# Patient Record
Sex: MALE | Race: WHITE | Employment: UNEMPLOYED | URBAN - METROPOLITAN AREA
[De-identification: names, ages, dates, MRNs, and addresses within clinical notes are randomized per-mention and may not be internally consistent; named-entity substitution may affect disease eponyms.]

---

## 2017-11-08 ENCOUNTER — HOSPITAL ENCOUNTER (EMERGENCY)
Age: 32
Discharge: HOME | End: 2017-11-08
Attending: EMERGENCY MEDICINE
Payer: COMMERCIAL

## 2017-11-08 VITALS
SYSTOLIC BLOOD PRESSURE: 135 MMHG | TEMPERATURE: 98.5 F | DIASTOLIC BLOOD PRESSURE: 92 MMHG | RESPIRATION RATE: 16 BRPM | HEART RATE: 74 BPM | OXYGEN SATURATION: 97 % | WEIGHT: 130 LBS | HEIGHT: 65 IN | BODY MASS INDEX: 21.66 KG/M2

## 2017-11-08 DIAGNOSIS — M54.50 CHRONIC BILATERAL LOW BACK PAIN WITHOUT SCIATICA: Primary | ICD-10-CM

## 2017-11-08 DIAGNOSIS — G89.29 CHRONIC BILATERAL LOW BACK PAIN WITHOUT SCIATICA: Primary | ICD-10-CM

## 2017-11-08 PROCEDURE — 99283 EMERGENCY DEPT VISIT LOW MDM: CPT

## 2017-11-08 PROCEDURE — 74011250637 HC RX REV CODE- 250/637: Performed by: PHYSICIAN ASSISTANT

## 2017-11-08 RX ORDER — IBUPROFEN 800 MG/1
800 TABLET ORAL
Qty: 20 TAB | Refills: 0 | Status: SHIPPED | OUTPATIENT
Start: 2017-11-08 | End: 2017-11-15

## 2017-11-08 RX ORDER — ACETAMINOPHEN 325 MG/1
650 TABLET ORAL
Qty: 20 TAB | Refills: 0 | Status: SHIPPED | OUTPATIENT
Start: 2017-11-08 | End: 2018-06-03

## 2017-11-08 RX ORDER — HYDROCODONE BITARTRATE AND ACETAMINOPHEN 5; 325 MG/1; MG/1
1 TABLET ORAL
Status: COMPLETED | OUTPATIENT
Start: 2017-11-08 | End: 2017-11-08

## 2017-11-08 RX ADMIN — HYDROCODONE BITARTRATE AND ACETAMINOPHEN 1 TABLET: 5; 325 TABLET ORAL at 10:19

## 2017-11-08 NOTE — DISCHARGE INSTRUCTIONS
Learning About How to Have a Healthy Back  What causes back pain? Back pain is often caused by overuse, strain, or injury. For example, people often hurt their backs playing sports or working in the yard, being jolted in a car accident, or lifting something too heavy. Aging plays a part too. Your bones and muscles tend to lose strength as you age, which makes injury more likely. The spongy discs between the bones of the spine (vertebrae) may suffer from wear and tear and no longer provide enough cushion between the bones. A disc that bulges or breaks open (herniated disc) can press on nerves, causing back pain. In some people, back pain is the result of arthritis, broken vertebrae caused by bone loss (osteoporosis), illness, or a spine problem. Although most people have back pain at one time or another, there are steps you can take to make it less likely. How can you have a healthy back? Reduce stress on your back through good posture  Slumping or slouching alone may not cause low back pain. But after the back has been strained or injured, bad posture can make pain worse. · Sleep in a position that maintains your back's normal curves and on a mattress that feels comfortable. Sleep on your side with a pillow between your knees, or sleep on your back with a pillow under your knees. These positions can reduce strain on your back. · Stand and sit up straight. \"Good posture\" generally means your ears, shoulders, and hips are in a straight line. · If you must stand for a long time, put one foot on a stool, ledge, or box. Switch feet every now and then. · Sit in a chair that is low enough to let you place both feet flat on the floor with both knees nearly level with your hips. If your chair or desk is too high, use a footrest to raise your knees. Place a small pillow, a rolled-up towel, or a lumbar roll in the curve of your back if you need extra support.   · Try a kneeling chair, which helps tilt your hips forward. This takes pressure off your lower back. · Try sitting on an exercise ball. It can rock from side to side, which helps keep your back loose. · When driving, keep your knees nearly level with your hips. Sit straight, and drive with both hands on the steering wheel. Your arms should be in a slightly bent position. Reduce stress on your back through careful lifting  · Squat down, bending at the hips and knees only. If you need to, put one knee to the floor and extend your other knee in front of you, bent at a right angle (half kneeling). · Press your chest straight forward. This helps keep your upper back straight while keeping a slight arch in your low back. · Hold the load as close to your body as possible, at the level of your belly button (navel). · Use your feet to change direction, taking small steps. · Lead with your hips as you change direction. Keep your shoulders in line with your hips as you move. · Set down your load carefully, squatting with your knees and hips only. Exercise and stretch your back  · Do some exercise on most days of the week, if your doctor says it is okay. You can walk, run, swim, or cycle. · Stretch your back muscles. Here are a few exercises to try:  Wale Grist on your back, and gently pull one bent knee to your chest. Put that foot back on the floor, and then pull the other knee to your chest.  ¨ Do pelvic tilts. Lie on your back with your knees bent. Tighten your stomach muscles. Pull your belly button (navel) in and up toward your ribs. You should feel like your back is pressing to the floor and your hips and pelvis are slightly lifting off the floor. Hold for 6 seconds while breathing smoothly. ¨ Sit with your back flat against a wall. · Keep your core muscles strong. The muscles of your back, belly (abdomen), and buttocks support your spine. ¨ Pull in your belly and imagine pulling your navel toward your spine. Hold this for 6 seconds, then relax.  Remember to keep breathing normally as you tense your muscles. ¨ Do curl-ups. Always do them with your knees bent. Keep your low back on the floor, and curl your shoulders toward your knees using a smooth, slow motion. Keep your arms folded across your chest. If this bothers your neck, try putting your hands behind your neck (not your head), with your elbows spread apart. ¨ Lie on your back with your knees bent and your feet flat on the floor. Tighten your belly muscles, and then push with your feet and raise your buttocks up a few inches. Hold this position 6 seconds as you continue to breathe normally, then lower yourself slowly to the floor. Repeat 8 to 12 times. ¨ If you like group exercise, try Pilates or yoga. These classes have poses that strengthen the core muscles. Lead a healthy lifestyle  · Stay at a healthy weight to avoid strain on your back. · Do not smoke. Smoking increases the risk of osteoporosis, which weakens the spine. If you need help quitting, talk to your doctor about stop-smoking programs and medicines. These can increase your chances of quitting for good. Where can you learn more? Go to http://garry-kyree.info/. Enter L315 in the search box to learn more about \"Learning About How to Have a Healthy Back. \"  Current as of: March 21, 2017  Content Version: 11.4  © 8642-9166 [employer]. Care instructions adapted under license by [employer] (which disclaims liability or warranty for this information). If you have questions about a medical condition or this instruction, always ask your healthcare professional. [place] [address] any warranty or liability for your use of this information.

## 2017-11-08 NOTE — ED TRIAGE NOTES
C/o lower back pain x 4-5 days, LLE is slightly shorter than the other from previous injury so gait is uneven, denies specific injury/trauma, pt is also [facility]

## 2017-11-08 NOTE — ED PROVIDER NOTES
Patient is a 27 y.o. male presenting with back pain. The history is provided by the patient. The history is limited by the condition of the patient (Pt hearing impaired. ). Back Pain    This is a recurrent (Acute on chronic low back pain (hx of trauma w/ Lt leg shortening years ago). Pain onset today. NKI/trauma. Increased outdoor work at home.) problem. The current episode started 6 to 12 hours ago. The problem has not changed since onset. The problem occurs constantly. Patient reports not work related injury. The pain is associated with no known injury. The pain is present in the lower back. The quality of the pain is described as aching. The pain does not radiate. The pain is at a severity of 10/10. The pain is moderate. The symptoms are aggravated by bending, twisting and certain positions. The pain is the same all the time. Pertinent negatives include no chest pain, no fever, no numbness, no weight loss, no headaches, no abdominal pain, no abdominal swelling, no bowel incontinence, no perianal numbness, no bladder incontinence, no dysuria, no pelvic pain, no leg pain, no paresthesias, no paresis, no tingling and no weakness. He has tried nothing (Hx of narcotic rx for pain. no medicine today.) for the symptoms. No past medical history on file. Past Surgical History:   Procedure Laterality Date    HX ORTHOPAEDIC      left leg         No family history on file. Social History     Social History    Marital status: SINGLE     Spouse name: N/A    Number of children: N/A    Years of education: N/A     Occupational History    Not on file.      Social History Main Topics    Smoking status: Current Every Day Smoker    Smokeless tobacco: Not on file    Alcohol use Yes    Drug use: Not on file    Sexual activity: Not on file     Other Topics Concern    Not on file     Social History Narrative    No narrative on file         ALLERGIES: Sulfa (sulfonamide antibiotics)    Review of Systems Constitutional: Negative for activity change, chills, fatigue, fever and weight loss. HENT: Negative. Eyes: Negative. Respiratory: Negative. Negative for cough and shortness of breath. Cardiovascular: Negative for chest pain, palpitations and leg swelling. Gastrointestinal: Negative for abdominal pain, bowel incontinence, diarrhea, nausea and vomiting. Genitourinary: Negative for bladder incontinence, dysuria and pelvic pain. Musculoskeletal: Positive for back pain and gait problem (chronic). Negative for arthralgias, joint swelling, myalgias, neck pain and neck stiffness. Skin: Negative. Negative for rash. Neurological: Negative for tingling, weakness, numbness, headaches and paresthesias. Psychiatric/Behavioral: Negative. Vitals:    11/08/17 0956   BP: (!) 135/92   Pulse: 74   Resp: 16   Temp: 98.5 °F (36.9 °C)   SpO2: 97%   Weight: 59 kg (130 lb)   Height: 5' 5\" (1.651 m)            Physical Exam   Constitutional: He is oriented to person, place, and time. He appears well-developed and well-nourished. No distress. HENT:   Head: Normocephalic and atraumatic. Right Ear: External ear normal.   Left Ear: External ear normal.   Nose: Nose normal.   Eyes: Conjunctivae and EOM are normal. Pupils are equal, round, and reactive to light. Neck: Normal range of motion. Pulmonary/Chest: Effort normal. No accessory muscle usage. No respiratory distress. Abdominal: There is no tenderness. Musculoskeletal: Normal range of motion. Cervical back: Normal.        Thoracic back: Normal.        Lumbar back: He exhibits pain. He exhibits normal range of motion, no tenderness, no bony tenderness, no swelling, no edema, no deformity, no laceration, no spasm and normal pulse. No paraspinal TTP. No midline spinal TTP. FROM. SLR (-) while seated. Left leg shortened. Neurological: He is alert and oriented to person, place, and time. Skin: Skin is warm, dry and intact.  No bruising, no ecchymosis and no rash noted. He is not diaphoretic. No erythema. No pallor. Psychiatric: He has a normal mood and affect. His speech is normal and behavior is normal. Judgment and thought content normal.   Nursing note and vitals reviewed. MDM  Number of Diagnoses or Management Options  Chronic bilateral low back pain without sciatica:   Diagnosis management comments: DDx: lumbago, acute on chronic back pain, sciatica, herniated disc, strain, sprain, fx unlikely (nki), abscess, neoplasm unlikely    LABORATORY TESTS:  No results found for this or any previous visit (from the past 12 hour(s)). IMAGING RESULTS:  No orders to display    MEDICATIONS GIVEN:  Medications  HYDROcodone-acetaminophen (NORCO) 5-325 mg per tablet 1 Tab (not administered)    IMPRESSION:  Chronic bilateral low back pain without sciatica  (primary encounter diagnosis)    PLAN:  1. Current Discharge Medication List    START taking these medications    ibuprofen (MOTRIN) 800 mg tablet  Take 1 Tab by mouth every six (6) hours as needed for Pain for up to 7 days. Qty: 20 Tab Refills: 0    acetaminophen (TYLENOL) 325 mg tablet  Take 2 Tabs by mouth every four (4) hours as needed for Pain. Qty: 20 Tab Refills: 0      CONTINUE these medications which have NOT CHANGED    oxyCODONE IR (ROXICODONE) 5 mg immediate release tablet  Take 1 Tab by mouth every four (4) hours as needed for Pain. Max Daily Amount: 30 mg.  Qty: 12 Tab Refills: 0        2.  Follow-up Information     Follow up With Details Comments [address] Schedule an appointment as soon as   possible for a visit in 1 week As needed, If symptoms worsen Emiliano Marquez [address]  [place] [address] [facility] Orthopedic Surgery Schedule an appointment as soon as possible for a   visit in 1 week As needed, If symptoms worsen [address] [address] [phone]      Return to ED if worse                  Amount and/or Complexity of Data Reviewed  Tests in the medicine section of CPT®: ordered and reviewed    Patient Progress  Patient progress: stable    ED Course       Procedures    10:15 AM  Pt refused muscle relaxer and NSAIDs. Discussed with patient risks and benefits of xray for new back pain. Explained new guidelines to treat with trial of medications if there are no red flag signs or symptoms. Follow up with ortho and/or PCP if symptoms continue and/or worsen. Reviewed red flag symptoms (including saddle numbness, tingling, weakness, hematuria, chest pain, abdominal pain, n/v, fever) and to return to ED in the case of any. 10:16 AM  I have discussed with patient their diagnosis, treatment, and follow up plan. The patient agrees to follow up as outlined in discharge paperwork and also to return to the ED with any worsening.  Claudia Espinal PA-C

## 2017-11-08 NOTE — ED NOTES
Pt given printed discharge instructions and 2 script(s). Pt verbalized understanding of instructions and script(s). Pt verbalized importance of following up with ortho. Pt alert and oriented, in no acute distress, ambulatory with self. Patient isn't happy about scripts for Tylenol and Motrin, pt states, \"they don't work for me. \"    Pt admitted to me that he drinks a beer every morning to stop his hands from shaking. I agree with providers choice not to prescribe opiates. I strongly urged patient to follow up with specialist for evaluation.

## 2017-11-08 NOTE — ED NOTES
Emergency Department Nursing Plan of Care       The Nursing Plan of Care is developed from the Nursing assessment and Emergency Department Attending provider initial evaluation. The plan of care may be reviewed in the ED Provider note.     The Plan of Care was developed with the following considerations:   Patient / Family readiness to learn indicated by:verbalized understanding  Persons(s) to be included in education: patient  Barriers to Learning/Limitations:hearing, pt reads lips, pt understood he needs to see specialist for his back    [address], RN    11/8/2017   10:25 AM

## 2018-06-03 ENCOUNTER — HOSPITAL ENCOUNTER (INPATIENT)
Age: 33
LOS: 3 days | Discharge: HOME HEALTH | DRG: 494 | End: 2018-06-06
Attending: EMERGENCY MEDICINE | Admitting: ORTHOPAEDIC SURGERY
Payer: MEDICARE

## 2018-06-03 ENCOUNTER — APPOINTMENT (OUTPATIENT)
Dept: GENERAL RADIOLOGY | Age: 33
DRG: 494 | End: 2018-06-03
Attending: PHYSICIAN ASSISTANT
Payer: MEDICARE

## 2018-06-03 DIAGNOSIS — S82.251A CLOSED DISPLACED COMMINUTED FRACTURE OF SHAFT OF RIGHT TIBIA, INITIAL ENCOUNTER: Primary | ICD-10-CM

## 2018-06-03 PROBLEM — S82.409A TIBIA/FIBULA FRACTURE, SHAFT: Status: ACTIVE | Noted: 2018-06-03

## 2018-06-03 PROBLEM — S82.209A TIBIA/FIBULA FRACTURE, SHAFT: Status: ACTIVE | Noted: 2018-06-03

## 2018-06-03 LAB
ABO + RH BLD: NORMAL
ALBUMIN SERPL-MCNC: 4.2 G/DL (ref 3.5–5)
ALBUMIN/GLOB SERPL: 1 {RATIO} (ref 1.1–2.2)
ALP SERPL-CCNC: 59 U/L (ref 45–117)
ALT SERPL-CCNC: 131 U/L (ref 12–78)
ANION GAP SERPL CALC-SCNC: 6 MMOL/L (ref 5–15)
APPEARANCE UR: ABNORMAL
AST SERPL-CCNC: 232 U/L (ref 15–37)
BACTERIA URNS QL MICRO: NEGATIVE /HPF
BASOPHILS # BLD: 0.1 K/UL (ref 0–0.1)
BASOPHILS NFR BLD: 1 % (ref 0–1)
BILIRUB SERPL-MCNC: 0.3 MG/DL (ref 0.2–1)
BILIRUB UR QL CFM: NEGATIVE
BLOOD GROUP ANTIBODIES SERPL: NORMAL
BUN SERPL-MCNC: 8 MG/DL (ref 6–20)
BUN/CREAT SERPL: 9 (ref 12–20)
CALCIUM SERPL-MCNC: 8.6 MG/DL (ref 8.5–10.1)
CHLORIDE SERPL-SCNC: 101 MMOL/L (ref 97–108)
CO2 SERPL-SCNC: 29 MMOL/L (ref 21–32)
COLOR UR: ABNORMAL
CREAT SERPL-MCNC: 0.9 MG/DL (ref 0.7–1.3)
DIFFERENTIAL METHOD BLD: ABNORMAL
EOSINOPHIL # BLD: 0 K/UL (ref 0–0.4)
EOSINOPHIL NFR BLD: 1 % (ref 0–7)
EPITH CASTS URNS QL MICRO: ABNORMAL /LPF
ERYTHROCYTE [DISTWIDTH] IN BLOOD BY AUTOMATED COUNT: 13.5 % (ref 11.5–14.5)
GLOBULIN SER CALC-MCNC: 4.1 G/DL (ref 2–4)
GLUCOSE SERPL-MCNC: 84 MG/DL (ref 65–100)
GLUCOSE UR STRIP.AUTO-MCNC: NEGATIVE MG/DL
HCT VFR BLD AUTO: 44.9 % (ref 36.6–50.3)
HGB BLD-MCNC: 15.7 G/DL (ref 12.1–17)
HGB UR QL STRIP: ABNORMAL
HYALINE CASTS URNS QL MICRO: ABNORMAL /LPF (ref 0–5)
IMM GRANULOCYTES # BLD: 0 K/UL (ref 0–0.04)
IMM GRANULOCYTES NFR BLD AUTO: 1 % (ref 0–0.5)
INR PPP: 0.9 (ref 0.9–1.1)
KETONES UR QL STRIP.AUTO: 15 MG/DL
LEUKOCYTE ESTERASE UR QL STRIP.AUTO: NEGATIVE
LYMPHOCYTES # BLD: 1.2 K/UL (ref 0.8–3.5)
LYMPHOCYTES NFR BLD: 16 % (ref 12–49)
MCH RBC QN AUTO: 34.1 PG (ref 26–34)
MCHC RBC AUTO-ENTMCNC: 35 G/DL (ref 30–36.5)
MCV RBC AUTO: 97.6 FL (ref 80–99)
MONOCYTES # BLD: 0.8 K/UL (ref 0–1)
MONOCYTES NFR BLD: 10 % (ref 5–13)
NEUTS SEG # BLD: 5.3 K/UL (ref 1.8–8)
NEUTS SEG NFR BLD: 72 % (ref 32–75)
NITRITE UR QL STRIP.AUTO: NEGATIVE
NRBC # BLD: 0 K/UL (ref 0–0.01)
NRBC BLD-RTO: 0 PER 100 WBC
PH UR STRIP: 5 [PH] (ref 5–8)
PLATELET # BLD AUTO: 160 K/UL (ref 150–400)
PMV BLD AUTO: 9.5 FL (ref 8.9–12.9)
POTASSIUM SERPL-SCNC: 4.2 MMOL/L (ref 3.5–5.1)
PROT SERPL-MCNC: 8.3 G/DL (ref 6.4–8.2)
PROT UR STRIP-MCNC: 30 MG/DL
PROTHROMBIN TIME: 9.2 SEC (ref 9–11.1)
RBC # BLD AUTO: 4.6 M/UL (ref 4.1–5.7)
RBC #/AREA URNS HPF: ABNORMAL /HPF (ref 0–5)
SODIUM SERPL-SCNC: 136 MMOL/L (ref 136–145)
SP GR UR REFRACTOMETRY: 1.03 (ref 1–1.03)
SPECIMEN EXP DATE BLD: NORMAL
UA: UC IF INDICATED,UAUC: ABNORMAL
UROBILINOGEN UR QL STRIP.AUTO: 1 EU/DL (ref 0.2–1)
WBC # BLD AUTO: 7.4 K/UL (ref 4.1–11.1)
WBC URNS QL MICRO: ABNORMAL /HPF (ref 0–4)

## 2018-06-03 PROCEDURE — 81001 URINALYSIS AUTO W/SCOPE: CPT | Performed by: PHYSICIAN ASSISTANT

## 2018-06-03 PROCEDURE — 94761 N-INVAS EAR/PLS OXIMETRY MLT: CPT

## 2018-06-03 PROCEDURE — 73630 X-RAY EXAM OF FOOT: CPT

## 2018-06-03 PROCEDURE — 71045 X-RAY EXAM CHEST 1 VIEW: CPT

## 2018-06-03 PROCEDURE — 75810000053 HC SPLINT APPLICATION

## 2018-06-03 PROCEDURE — 74011250637 HC RX REV CODE- 250/637: Performed by: PHYSICIAN ASSISTANT

## 2018-06-03 PROCEDURE — 74011250636 HC RX REV CODE- 250/636: Performed by: PHYSICIAN ASSISTANT

## 2018-06-03 PROCEDURE — 36415 COLL VENOUS BLD VENIPUNCTURE: CPT | Performed by: EMERGENCY MEDICINE

## 2018-06-03 PROCEDURE — 99285 EMERGENCY DEPT VISIT HI MDM: CPT

## 2018-06-03 PROCEDURE — 73590 X-RAY EXAM OF LOWER LEG: CPT

## 2018-06-03 PROCEDURE — 86900 BLOOD TYPING SEROLOGIC ABO: CPT | Performed by: PHYSICIAN ASSISTANT

## 2018-06-03 PROCEDURE — 85610 PROTHROMBIN TIME: CPT | Performed by: PHYSICIAN ASSISTANT

## 2018-06-03 PROCEDURE — 80053 COMPREHEN METABOLIC PANEL: CPT | Performed by: EMERGENCY MEDICINE

## 2018-06-03 PROCEDURE — 65270000029 HC RM PRIVATE

## 2018-06-03 PROCEDURE — 73610 X-RAY EXAM OF ANKLE: CPT

## 2018-06-03 PROCEDURE — 93005 ELECTROCARDIOGRAM TRACING: CPT

## 2018-06-03 PROCEDURE — 85025 COMPLETE CBC W/AUTO DIFF WBC: CPT | Performed by: EMERGENCY MEDICINE

## 2018-06-03 RX ORDER — AMOXICILLIN 250 MG
2 CAPSULE ORAL 2 TIMES DAILY
Status: DISCONTINUED | OUTPATIENT
Start: 2018-06-03 | End: 2018-06-04

## 2018-06-03 RX ORDER — SODIUM CHLORIDE 9 MG/ML
75 INJECTION, SOLUTION INTRAVENOUS CONTINUOUS
Status: DISCONTINUED | OUTPATIENT
Start: 2018-06-03 | End: 2018-06-06 | Stop reason: HOSPADM

## 2018-06-03 RX ORDER — OXYCODONE HYDROCHLORIDE 5 MG/1
10 TABLET ORAL
Status: DISCONTINUED | OUTPATIENT
Start: 2018-06-03 | End: 2018-06-04

## 2018-06-03 RX ORDER — CEFAZOLIN SODIUM/WATER 2 G/20 ML
2 SYRINGE (ML) INTRAVENOUS
Status: COMPLETED | OUTPATIENT
Start: 2018-06-04 | End: 2018-06-04

## 2018-06-03 RX ORDER — HYDROCODONE BITARTRATE AND ACETAMINOPHEN 5; 325 MG/1; MG/1
1 TABLET ORAL
Status: COMPLETED | OUTPATIENT
Start: 2018-06-03 | End: 2018-06-03

## 2018-06-03 RX ORDER — ONDANSETRON 2 MG/ML
4 INJECTION INTRAMUSCULAR; INTRAVENOUS
Status: DISCONTINUED | OUTPATIENT
Start: 2018-06-03 | End: 2018-06-06 | Stop reason: HOSPADM

## 2018-06-03 RX ORDER — MORPHINE SULFATE 10 MG/ML
6 INJECTION, SOLUTION INTRAMUSCULAR; INTRAVENOUS
Status: COMPLETED | OUTPATIENT
Start: 2018-06-03 | End: 2018-06-03

## 2018-06-03 RX ORDER — CEFAZOLIN SODIUM/WATER 2 G/20 ML
2 SYRINGE (ML) INTRAVENOUS EVERY 8 HOURS
Status: DISCONTINUED | OUTPATIENT
Start: 2018-06-03 | End: 2018-06-03 | Stop reason: SDUPTHER

## 2018-06-03 RX ORDER — SODIUM CHLORIDE 0.9 % (FLUSH) 0.9 %
5-10 SYRINGE (ML) INJECTION AS NEEDED
Status: DISCONTINUED | OUTPATIENT
Start: 2018-06-03 | End: 2018-06-06

## 2018-06-03 RX ORDER — SODIUM CHLORIDE 0.9 % (FLUSH) 0.9 %
5-10 SYRINGE (ML) INJECTION EVERY 8 HOURS
Status: DISCONTINUED | OUTPATIENT
Start: 2018-06-03 | End: 2018-06-06

## 2018-06-03 RX ORDER — OXYCODONE HYDROCHLORIDE 5 MG/1
5 TABLET ORAL
Status: DISCONTINUED | OUTPATIENT
Start: 2018-06-03 | End: 2018-06-04

## 2018-06-03 RX ORDER — NALOXONE HYDROCHLORIDE 0.4 MG/ML
0.4 INJECTION, SOLUTION INTRAMUSCULAR; INTRAVENOUS; SUBCUTANEOUS AS NEEDED
Status: DISCONTINUED | OUTPATIENT
Start: 2018-06-03 | End: 2018-06-06

## 2018-06-03 RX ORDER — ACETAMINOPHEN 325 MG/1
650 TABLET ORAL EVERY 6 HOURS
Status: DISCONTINUED | OUTPATIENT
Start: 2018-06-03 | End: 2018-06-04

## 2018-06-03 RX ADMIN — MORPHINE SULFATE 6 MG: 10 INJECTION INTRAVENOUS at 11:40

## 2018-06-03 RX ADMIN — SODIUM CHLORIDE 75 ML/HR: 900 INJECTION, SOLUTION INTRAVENOUS at 14:33

## 2018-06-03 RX ADMIN — OXYCODONE HYDROCHLORIDE 10 MG: 5 TABLET ORAL at 21:17

## 2018-06-03 RX ADMIN — OXYCODONE HYDROCHLORIDE 5 MG: 5 TABLET ORAL at 17:04

## 2018-06-03 RX ADMIN — Medication 10 ML: at 14:34

## 2018-06-03 RX ADMIN — SODIUM CHLORIDE 75 ML/HR: 900 INJECTION, SOLUTION INTRAVENOUS at 17:08

## 2018-06-03 RX ADMIN — OXYCODONE HYDROCHLORIDE 5 MG: 5 TABLET ORAL at 17:06

## 2018-06-03 RX ADMIN — ACETAMINOPHEN 650 MG: 325 TABLET ORAL at 17:02

## 2018-06-03 RX ADMIN — Medication 5 ML: at 21:17

## 2018-06-03 RX ADMIN — HYDROCODONE BITARTRATE AND ACETAMINOPHEN 1 TABLET: 5; 325 TABLET ORAL at 08:59

## 2018-06-03 RX ADMIN — SODIUM CHLORIDE 75 ML/HR: 900 INJECTION, SOLUTION INTRAVENOUS at 21:18

## 2018-06-03 NOTE — ED NOTES
Long posterior cast placed by PA and ED tech. Good cap refill, toes cool to touch (unchanged from prior assessment)  Pt tolerated well.

## 2018-06-03 NOTE — ED NOTES
Pt's friend, Gilford Fetter, back to bedside. Gilford Fetter confirms pt's SO is also deaf, reports her speech is difficult to understand. States she is home with 2 children and will not be coming in. He attempted to replace the hearing aid batteries but was unable to find replacements today. States he will be back tomorrow. Pt is able to text but unable to hear on the phone.

## 2018-06-03 NOTE — ED NOTES
Transported to room 26 via stretcher from [facility]. Right lower leg elevated on several blankets for comfort and ice pack applied. +pedal pulse, brick cap refill. Pain reported 10/10. CODE fX called.

## 2018-06-03 NOTE — ROUTINE PROCESS
Bedside and Verbal shift change report given to [address] (oncoming nurse) by Nicolasa Tam RN (offgoing nurse). Report included the following information SBAR, Kardex, Intake/Output, MAR and Recent Results.

## 2018-06-03 NOTE — ED NOTES
Case discussed with ED PA and Ortho PA; will plan for admission with surgery tomorrow; will place in long cast and medicate for pain.

## 2018-06-03 NOTE — ROUTINE PROCESS
TRANSFER - OUT REPORT:    Verbal report given to [facility] [facility] RN(name) on Noemi Hargrove  being transferred to [phone] (unit) for routine progression of care       Report consisted of patients Situation, Background, Assessment and   Recommendations(SBAR). Information from the following report(s) ED Summary and MAR was reviewed with the receiving nurse. Lines:   Peripheral IV 06/03/18 Left Antecubital (Active)   Site Assessment Clean, dry, & intact 6/3/2018  9:31 AM   Phlebitis Assessment 0 6/3/2018  9:31 AM   Infiltration Assessment 0 6/3/2018  9:31 AM   Dressing Status Clean, dry, & intact 6/3/2018  9:31 AM   Dressing Type Transparent 6/3/2018  9:31 AM   Hub Color/Line Status Flushed 6/3/2018  9:31 AM   Action Taken Blood drawn 6/3/2018  9:31 AM        Opportunity for questions and clarification was provided. Patient transported with:   Tech or transport team to transfer. Will do EKG prior to transport.
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
## 2018-06-03 NOTE — IP AVS SNAPSHOT
[address] [facility] 
[phone] Patient: Marcia Niño MRN: [id number] :1985 About your hospitalization You were admitted on:  Amber 3, 2018 You last received care in the:  \[facility]\"" 3 ORTHOPEDICS You were discharged on:  2018 Why you were hospitalized Your primary diagnosis was:  Not on File Your diagnoses also included:  Tibia/Fibula Fracture, Shaft Follow-up Information Follow up With Details Comments Contact Info AT HOME CARE  This is your home health provider  [address] [facility] [address] [phone] [facility]  This is your provider for rolling [address] [address] 
[phone] None   None (395) Patient stated that they have no PCP Unique Stockton MD In 1 month  [address] [facility] 
[phone] Discharge Orders None A check abe indicates which time of day the medication should be taken. My Medications START taking these medications Instructions Each Dose to Equal  
 Morning Noon Evening Bedtime  
 aspirin 81 mg chewable tablet Your last dose was: Your next dose is: Take 1 Tab by mouth two (2) times a day. 81 mg  
    
   
   
   
  
 famotidine 20 mg tablet Commonly known as:  PEPCID Your last dose was: Your next dose is: Take 1 Tab by mouth two (2) times a day. 20 mg  
    
   
   
   
  
 naloxone 2 mg/actuation Spry Commonly known as:  [employer] Your last dose was: Your next dose is:    
   
   
 Use 1 spray intranasally into 1 nostril. Use a new Narcan nasal spray for subsequent doses and administer into alternating nostrils. May repeat every 2 to 3 minutes as needed. Indications: OPIATE-INDUCED RESPIRATORY DEPRESSION, Opioid Toxicity oxyCODONE IR 5 mg immediate release tablet Commonly known as:  Major Eddie Your last dose was: Your next dose is: Take 1-2 Tabs by mouth every four (4) hours as needed. Max Daily Amount: 60 mg.  
 5-10 mg  
    
   
   
   
  
 senna-docusate 8.6-50 mg per tablet Commonly known as:  Rizwana Roller Your last dose was: Your next dose is: Take 1 Tab by mouth two (2) times a day. 1 Tab CONTINUE taking these medications Instructions Each Dose to Equal  
 Morning Noon Evening Bedtime BC HEADACHE POWDER PO Your last dose was: Your next dose is: Take  by mouth. Where to Get Your Medications Information on where to get these meds will be given to you by the nurse or doctor. ! Ask your nurse or doctor about these medications  
  aspirin 81 mg chewable tablet  
 famotidine 20 mg tablet  
 naloxone 2 mg/actuation Spry  
 oxyCODONE IR 5 mg immediate release tablet  
 senna-docusate 8.6-50 mg per tablet Opioid Education Prescription Opioids: What You Need to Know: 
 
Prescription opioids can be used to help relieve moderate-to-severe pain and are often prescribed following a surgery or injury, or for certain health conditions. These medications can be an important part of treatment but also come with serious risks. Opioids are strong pain medicines. Examples include hydrocodone, oxycodone, fentanyl, and morphine. Heroin is an example of an illegal opioid. It is important to work with your health care provider to make sure you are getting the safest, most effective care. WHAT ARE THE RISKS AND SIDE EFFECTS OF OPIOID USE? Prescription opioids carry serious risks of addiction and overdose, especially with prolonged use. An opioid overdose, often marked by slow breathing, can cause sudden death.   The use of prescription opioids can have a number of side effects as well, even when taken as directed. · Tolerance-meaning you might need to take more of a medication for the same pain relief · Physical dependence-meaning you have symptoms of withdrawal when the medication is stopped. Withdrawal symptoms can include nausea, sweating, chills, diarrhea, stomach cramps, and muscle aches. Withdrawal can last up to several weeks, depending on which drug you took and how long you took it. · Increased sensitivity to pain · Constipation · Nausea, vomiting, and dry mouth · Sleepiness and dizziness · Confusion · Depression · Low levels of testosterone that can result in lower sex drive, energy, and strength · Itching and sweating RISKS ARE GREATER WITH:      
· History of drug misuse, substance use disorder, or overdose · Mental health conditions (such as depression or anxiety) · Sleep apnea · Older age (72 years or older) · Pregnancy Avoid alcohol while taking prescription opioids. Also, unless specifically advised by your health care provider, medications to avoid include: · Benzodiazepines (such as Xanax or Valium) · Muscle relaxants (such as Soma or Flexeril) · Hypnotics (such as Ambien or Lunesta) · Other prescription opioids KNOW YOUR OPTIONS Talk to your health care provider about ways to manage your pain that don't involve prescription opioids. Some of these options may actually work better and have fewer risks and side effects. Options may include: 
· Pain relievers such as acetaminophen, ibuprofen, and naproxen · Some medications that are also used for depression or seizures · Physical therapy and exercise · Counseling to help patients learn how to cope better with triggers of pain and stress. · Application of heat or cold compress · Massage therapy · Relaxation techniques Be Informed Make sure you know the name of your medication, how much and how often to take it, and its potential risks & side effects. IF YOU ARE PRESCRIBED OPIOIDS FOR PAIN: 
· Never take opioids in greater amounts or more often than prescribed. Remember the goal is not to be pain-free but to manage your pain at a tolerable level. · Follow up with your primary care provider to: · Work together to create a plan on how to manage your pain. · Talk about ways to help manage your pain that don't involve prescription opioids. · Talk about any and all concerns and side effects. · Help prevent misuse and abuse. · Never sell or share prescription opioids · Help prevent misuse and abuse. · Store prescription opioids in a secure place and out of reach of others (this may include visitors, children, friends, and family). · Safely dispose of unused/unwanted prescription opioids: Find your community drug take-back program or your pharmacy mail-back program, or flush them down the toilet, following guidance from the Food and Drug Administration (www.fda.gov/Drugs/ResourcesForYou). · Visit www.cdc.gov/drugoverdose to learn about the risks of opioid abuse and overdose. · If you believe you may be struggling with addiction, tell your health care provider and ask for guidance or call [address] [place], [place] [address]Z [employer]ane at [phone]. Discharge Instructions Sonali Berger Surgery: right tibial Fracture Repair Surgeon:   Miracle Borja MD 
Surgery Date:  6/4/2018 ? To relieve pain: ? Use ice/gel packs. ? Put the ice pack directly over the wound, or anywhere you are hurting or swollen. ? To control pain and swelling, keep ice on regularly, especially after physical activity. ? The packs should stay cold for 3-4 hours. When it is not cold anymore, rotate with the packs in the freezer. ? Elevate your leg. This will also keep swelling down. Work on bending your knee and ankle several times a day. ? Rest for at least 20 minutes between activity or exercises. ? To keep track of your pain medications, write down what you take and when you take it. ? The last dose of pain medication you got in the hospital was:    
Medication Dose Date & Time ? Choose your medications based on the pain scale below: ? To keep your pain under control, take Tylenol every 6 hours for 14 days - even if you feel like you dont need it. ? For mild to moderate pain (1-6 on pain scale), take one pain pill every 3-4 hours as needed. ? For severe pain (7-10 on pain scale), take two pain pills every 3-4 hours as needed. ? To prevent nausea, take your pain medications with food. Pain Scale ? As your pain lessens: 
 
? Slowly start taking less pain medication. You may do this by waiting longer between doses or by taking smaller doses. ? Stop using the pain medications as soon as you no longer need it, usually in 2-3 weeks. ? Aspirin ? To prevent blood clots, you will need to take Aspirin 81 mg twice a day for 30 days. ? To prevent stomach upset or bleeding: ? Take Pepcid 20 mg twice a day, or a similar home medication, while you are taking a blood thinner. ? Do not take non-steroidal anti-inflammatory (NSAID) medications (Ibuprofen, Advil, Motrin, Naproxen, etc.) OPSITE (Honeycomb dressing) ? Keep your clear, waterproof dressing in place for 5-7 days after your leave the hospital. 
  
? If you are still having drainage, you will need to change your dressing in 5-7 days. You will be given one extra dressing to use at home. ? If there is no more drainage from the wound, you may leave it open to the air. OPSITE DRESSING INSTRUCTIONS 
 
- 
 
 
 
? Do not apply oils or lotions over it. ? You may shower with this dressing but do not soak it.   Gently pat your wound dry when you get out of the shower. ? DO NOTs: 
? Do not rub your surgical wound ? Do not put lotion or oils on your wound. ? Do not take a tub bath or go swimming until your doctor says it is ok. 
 
? You may shower with this dressing over your wound. ? After showering pat the dressing dry. ? If you have staples a home health nurse will remove them in about 10 -14 days after surgery. ? To increase and promote healing: 
 
? Stop Smoking (or at least cut back on 
     Smoking). ? Eat a well-balanced diet (high in protein 
     and vitamin C). ? If you have a poor appetite, drink Ensure, Glucerna, or CarnationInstant Breakfast for the next 30 days. ? If you are diabetic, you should control your blood  
      sugars to prevent infection and help your wound  
      to heal. 
 
 
? To prevent constipation, stay active & drink plenty of fluid. ? While using pain medications, you should also take stool softeners and laxatives, such as Pericolace and Miralax. ? If you are having too many bowel movements, then you may need  to stop taking the laxatives. ? You should have a bowel movement 3-4 days after surgery and then at least every other day while on pain medication. ? To improve your recovery, you must be active! 
 
? WEIGHT BEARING 
? None = you may not put any weight or pressure through that leg.     
 
? THERAPY ? If you go to a rehab facility, physical therapy (PT) will need to work with you daily, and sometimes twice a day to teach you how to: 
 
? Get in and out of the bed ? Walk (gait training) and climb stairs ? Do exercises and gain strength ? Use a walker, crutches or cane ? You may also need to have occupational therapy (OT) work with you to help you practice: 
 
? Getting on and off the toilet ? Self-care (brushing teeth, eating, bathing, etc) ?  If you go directly home, home health physical therapy will come the day after you leave the hospital.  They will visit about 4 times over the next couple of weeks to teach you how to get out of bed, to safely walk in your home, and to do your exercises. ? NO DRIVING until your surgeon tells you it is ok. 
 
? You can return to work when cleared by a physician. ? Please call your physician immediately if you have: 
 
? Constant bleeding from your wound. ? Increasing redness or swelling around your wound (Some warmth, bruising and swelling is normal). ? Change in wound drainage (increase in amount, color, or bad smell). ? Change in mental status (unusual behavior) ? Temperature over 101.5 degrees Fahrenheit ? Increased pain, swelling, or redness in the calf (back of your lower leg), thigh, ankle or foot. ? Emergency: CALL 911 if you have: 
? Shortness of breath ? Chest pain when you cough or taking a deep breath ? Please call your surgeons office at [facility] for a follow up appointment. ? You should call as soon as you get home or the next day after discharge. Ask to make an appointment in 4 weeks. [employer] Announcement We are excited to announce that we are making your provider's discharge notes available to you in [employer] You will see these notes when they are completed and signed by the physician that discharged you from your recent hospital stay. If you have any questions or concerns about any information you see in [employer] please call the Health Information Department where you were seen or reach out to your Primary Care Provider for more information about your plan of care. Introducing [facility] SERVICES! [facility] introduces [employer] patient portal. Now you can access parts of your medical record, email your doctor's office, and request medication refills online. 1. In your internet browser, go to [email or web address] 2. Click on the First Time User? Click Here link in the Sign In box. You will see the New Member Sign Up page. 3. Enter your Chorus Access Code exactly as it appears below. You will not need to use this code after youve completed the sign-up process. If you do not sign up before the expiration date, you must request a new code. · Chorus Access Code: [email or web address] Expires: 9/1/2018  8:51 AM 
 
4. Enter the last four digits of your Social Security Number (xxxx) and Date of Birth (mm/dd/yyyy) as indicated and click Submit. You will be taken to the next sign-up page. 5. Create a Chorus ID. This will be your Chorus login ID and cannot be changed, so think of one that is secure and easy to remember. 6. Create a Kit[employer] password. You can change your password at any time. 7. Enter your Password Reset Question and Answer. This can be used at a later time if you forget your password. 8. Enter your e-mail address. You will receive e-mail notification when new information is available in [address] 9. Click Sign Up. You can now view and download portions of your medical record. 10. Click the Download Summary menu link to download a portable copy of your medical information. If you have questions, please visit the Frequently Asked Questions section of the Chorus website. Remember, Chorus is NOT to be used for urgent needs. For medical emergencies, dial 911. Now available from your iPhone and Android! Introducing Reginaldo Foreman As a [facility] patient, I wanted to make you aware of our electronic visit tool called Reginaldo Foreman. [facility] allows you to connect within minutes with a medical provider 24 hours a day, seven days a week via a mobile device or tablet or logging into a secure website from your computer. You can access Reginaldo Foreman from anywhere in the [place].  
 
A virtual visit might be right for you when you have a simple condition and feel like you just dont want to get out of bed, or cant get away from work for an appointment, when your regular [facility] provider is not available (evenings, weekends or holidays), or when youre out of town and need minor care. Electronic visits cost only $49 and if the [facility] 24/7 provider determines a prescription is needed to treat your condition, one can be electronically transmitted to a nearby pharmacy*. Please take a moment to enroll today if you have not already done so. The enrollment process is free and takes just a few minutes. To enroll, please download the [employer] 24/7 teofilo to your tablet or phone, or visit [email or web address] to enroll on your computer. And, as an [address] [place] [address] patient with a [employer] account, the results of your visits will be scanned into your electronic medical record and your primary care provider will be able to view the scanned results. We urge you to continue to see your regular [facility] provider for your ongoing medical care. And while your primary care provider may not be the one available when you seek a [facility] virtual visit, the peace of mind you get from getting a real diagnosis real time can be priceless. For more information on [employer], view our Frequently Asked Questions (FAQs) at [email or web address]. Sincerely, 
 
Thomas Ramesh MD 
Chief Medical Officer Jay8 Peyton Meyer *:  certain medications cannot be prescribed via [facility] Unresulted Labs-Please follow up with your PCP about these lab tests Order Current Status XR FLUOROSCOPY UNDER 60 MINUTES In process Providers Seen During Your Hospitalization Provider Specialty Primary office phone Vito Romano MD Emergency Medicine [phone] Glenny Malloy MD Orthopedic Surgery [phone] Your Primary Care Physician (PCP) Primary Care Physician Office Phone Office Fax NONE ** None ** ** None ** You are allergic to the following Allergen Reactions Sulfa (Sulfonamide Antibiotics) Hives Recent Documentation Height Weight BMI Smoking Status 1.626 m 56.7 kg 21.46 kg/m2 Current Every Day Smoker Emergency Contacts Name Discharge Info Relation Home Work Mobile [address] CAREGIVER [3] Parent [1] [phone] Sloane Dumont   [phone] Patient Belongings The following personal items are in your possession at time of discharge: 
  Dental Appliances: None  Visual Aid: None   Hearing Aids/Status: Right (battery dead; not in Pre Op)  Home Medications: None   Jewelry: None  Clothing: None (inpatient belongings in room)    Other Valuables: None  Personal Items Sent to Safe: none sent to security Please provide this summary of care documentation to your next provider. Signatures-by signing, you are acknowledging that this After Visit Summary has been reviewed with you and you have received a copy. Patient Signature:  ____________________________________________________________ Date:  ____________________________________________________________  
  
Gearnylaine Moulds Provider Signature:  ____________________________________________________________ Date:  ____________________________________________________________

## 2018-06-03 NOTE — PROGRESS NOTES
TY Blake notified for pt vomiting. Call back number [phone]. Order given for prn zofran 4mg IV every 6 hours as needed for nausea/ vomiting.
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
## 2018-06-04 ENCOUNTER — ANESTHESIA EVENT (OUTPATIENT)
Dept: SURGERY | Age: 33
DRG: 494 | End: 2018-06-04
Payer: MEDICARE

## 2018-06-04 ENCOUNTER — APPOINTMENT (OUTPATIENT)
Dept: GENERAL RADIOLOGY | Age: 33
DRG: 494 | End: 2018-06-04
Attending: ORTHOPAEDIC SURGERY
Payer: MEDICARE

## 2018-06-04 ENCOUNTER — ANESTHESIA (OUTPATIENT)
Dept: SURGERY | Age: 33
DRG: 494 | End: 2018-06-04
Payer: MEDICARE

## 2018-06-04 LAB
ATRIAL RATE: 62 BPM
CALCULATED P AXIS, ECG09: 45 DEGREES
CALCULATED R AXIS, ECG10: 64 DEGREES
CALCULATED T AXIS, ECG11: 65 DEGREES
DIAGNOSIS, 93000: NORMAL
P-R INTERVAL, ECG05: 110 MS
Q-T INTERVAL, ECG07: 416 MS
QRS DURATION, ECG06: 90 MS
QTC CALCULATION (BEZET), ECG08: 422 MS
VENTRICULAR RATE, ECG03: 62 BPM

## 2018-06-04 PROCEDURE — 74011250637 HC RX REV CODE- 250/637: Performed by: NURSE PRACTITIONER

## 2018-06-04 PROCEDURE — 0QSG36Z REPOSITION RIGHT TIBIA WITH INTRAMEDULLARY INTERNAL FIXATION DEVICE, PERCUTANEOUS APPROACH: ICD-10-PCS | Performed by: ORTHOPAEDIC SURGERY

## 2018-06-04 PROCEDURE — C1769 GUIDE WIRE: HCPCS | Performed by: ORTHOPAEDIC SURGERY

## 2018-06-04 PROCEDURE — 76000 FLUOROSCOPY <1 HR PHYS/QHP: CPT

## 2018-06-04 PROCEDURE — 77030026438 HC STYL ET INTUB CARD -A: Performed by: NURSE ANESTHETIST, CERTIFIED REGISTERED

## 2018-06-04 PROCEDURE — 73600 X-RAY EXAM OF ANKLE: CPT

## 2018-06-04 PROCEDURE — 77030012896: Performed by: ORTHOPAEDIC SURGERY

## 2018-06-04 PROCEDURE — 77030018846 HC SOL IRR STRL H20 ICUM -A: Performed by: ORTHOPAEDIC SURGERY

## 2018-06-04 PROCEDURE — 76210000016 HC OR PH I REC 1 TO 1.5 HR: Performed by: ORTHOPAEDIC SURGERY

## 2018-06-04 PROCEDURE — 77030008771 HC TU NG SALEM SUMP -A: Performed by: NURSE ANESTHETIST, CERTIFIED REGISTERED

## 2018-06-04 PROCEDURE — 74011250636 HC RX REV CODE- 250/636

## 2018-06-04 PROCEDURE — 74011000250 HC RX REV CODE- 250

## 2018-06-04 PROCEDURE — 77030036660

## 2018-06-04 PROCEDURE — 74011250637 HC RX REV CODE- 250/637: Performed by: PHYSICIAN ASSISTANT

## 2018-06-04 PROCEDURE — 74011250636 HC RX REV CODE- 250/636: Performed by: ORTHOPAEDIC SURGERY

## 2018-06-04 PROCEDURE — 77030000031 HC BIT DRL QC SYNT -C: Performed by: ORTHOPAEDIC SURGERY

## 2018-06-04 PROCEDURE — C1713 ANCHOR/SCREW BN/BN,TIS/BN: HCPCS | Performed by: ORTHOPAEDIC SURGERY

## 2018-06-04 PROCEDURE — 77030000032 HC CUF TRNQT ZIMM -B: Performed by: ORTHOPAEDIC SURGERY

## 2018-06-04 PROCEDURE — 77030019908 HC STETH ESOPH SIMS -A: Performed by: NURSE ANESTHETIST, CERTIFIED REGISTERED

## 2018-06-04 PROCEDURE — 74011000272 HC RX REV CODE- 272: Performed by: ORTHOPAEDIC SURGERY

## 2018-06-04 PROCEDURE — 74011250636 HC RX REV CODE- 250/636: Performed by: PHYSICIAN ASSISTANT

## 2018-06-04 PROCEDURE — 77030011640 HC PAD GRND REM COVD -A: Performed by: ORTHOPAEDIC SURGERY

## 2018-06-04 PROCEDURE — 65270000029 HC RM PRIVATE

## 2018-06-04 PROCEDURE — 77010033678 HC OXYGEN DAILY

## 2018-06-04 PROCEDURE — 77030016474 HC BIT DRL QC3 SYNT -C: Performed by: ORTHOPAEDIC SURGERY

## 2018-06-04 PROCEDURE — 77030020782 HC GWN BAIR PAWS FLX 3M -B

## 2018-06-04 PROCEDURE — 77030008684 HC TU ET CUF COVD -B: Performed by: NURSE ANESTHETIST, CERTIFIED REGISTERED

## 2018-06-04 PROCEDURE — 77030008467 HC STPLR SKN COVD -B: Performed by: ORTHOPAEDIC SURGERY

## 2018-06-04 PROCEDURE — 74011000250 HC RX REV CODE- 250: Performed by: ORTHOPAEDIC SURGERY

## 2018-06-04 PROCEDURE — 77030014405 HC GD ROD RMR SYNT -C: Performed by: ORTHOPAEDIC SURGERY

## 2018-06-04 PROCEDURE — 77030018836 HC SOL IRR NACL ICUM -A: Performed by: ORTHOPAEDIC SURGERY

## 2018-06-04 PROCEDURE — 76060000033 HC ANESTHESIA 1 TO 1.5 HR: Performed by: ORTHOPAEDIC SURGERY

## 2018-06-04 PROCEDURE — 77030031139 HC SUT VCRL2 J&J -A: Performed by: ORTHOPAEDIC SURGERY

## 2018-06-04 PROCEDURE — 76010000149 HC OR TIME 1 TO 1.5 HR: Performed by: ORTHOPAEDIC SURGERY

## 2018-06-04 DEVICE — SCREW BNE L44MM DIA5MM NONSTERILE TIB LT GRN TI ST CANN LOK: Type: IMPLANTABLE DEVICE | Site: LEG | Status: FUNCTIONAL

## 2018-06-04 DEVICE — IMPLANTABLE DEVICE: Type: IMPLANTABLE DEVICE | Site: LEG | Status: FUNCTIONAL

## 2018-06-04 DEVICE — SCREW BNE L34MM DIA5MM NONSTERILE TIB LT GRN TI ST CANN LOK: Type: IMPLANTABLE DEVICE | Site: LEG | Status: FUNCTIONAL

## 2018-06-04 RX ORDER — NALOXONE HYDROCHLORIDE 0.4 MG/ML
0.4 INJECTION, SOLUTION INTRAMUSCULAR; INTRAVENOUS; SUBCUTANEOUS AS NEEDED
Status: DISCONTINUED | OUTPATIENT
Start: 2018-06-04 | End: 2018-06-06 | Stop reason: HOSPADM

## 2018-06-04 RX ORDER — SODIUM CHLORIDE 0.9 % (FLUSH) 0.9 %
5-10 SYRINGE (ML) INJECTION AS NEEDED
Status: DISCONTINUED | OUTPATIENT
Start: 2018-06-04 | End: 2018-06-04 | Stop reason: HOSPADM

## 2018-06-04 RX ORDER — CEFAZOLIN SODIUM/WATER 2 G/20 ML
2 SYRINGE (ML) INTRAVENOUS EVERY 8 HOURS
Status: DISCONTINUED | OUTPATIENT
Start: 2018-06-04 | End: 2018-06-04

## 2018-06-04 RX ORDER — SODIUM CHLORIDE, SODIUM LACTATE, POTASSIUM CHLORIDE, CALCIUM CHLORIDE 600; 310; 30; 20 MG/100ML; MG/100ML; MG/100ML; MG/100ML
100 INJECTION, SOLUTION INTRAVENOUS CONTINUOUS
Status: DISCONTINUED | OUTPATIENT
Start: 2018-06-04 | End: 2018-06-04 | Stop reason: HOSPADM

## 2018-06-04 RX ORDER — FENTANYL CITRATE 50 UG/ML
25 INJECTION, SOLUTION INTRAMUSCULAR; INTRAVENOUS
Status: DISCONTINUED | OUTPATIENT
Start: 2018-06-04 | End: 2018-06-04 | Stop reason: HOSPADM

## 2018-06-04 RX ORDER — ROPIVACAINE HYDROCHLORIDE 5 MG/ML
30 INJECTION, SOLUTION EPIDURAL; INFILTRATION; PERINEURAL AS NEEDED
Status: DISCONTINUED | OUTPATIENT
Start: 2018-06-04 | End: 2018-06-04 | Stop reason: HOSPADM

## 2018-06-04 RX ORDER — FERROUS SULFATE, DRIED 160(50) MG
1 TABLET, EXTENDED RELEASE ORAL
Status: DISCONTINUED | OUTPATIENT
Start: 2018-06-05 | End: 2018-06-06 | Stop reason: HOSPADM

## 2018-06-04 RX ORDER — FENTANYL CITRATE 50 UG/ML
INJECTION, SOLUTION INTRAMUSCULAR; INTRAVENOUS AS NEEDED
Status: DISCONTINUED | OUTPATIENT
Start: 2018-06-04 | End: 2018-06-04 | Stop reason: HOSPADM

## 2018-06-04 RX ORDER — ONDANSETRON 2 MG/ML
INJECTION INTRAMUSCULAR; INTRAVENOUS AS NEEDED
Status: DISCONTINUED | OUTPATIENT
Start: 2018-06-04 | End: 2018-06-04 | Stop reason: HOSPADM

## 2018-06-04 RX ORDER — CEFAZOLIN SODIUM/WATER 2 G/20 ML
2 SYRINGE (ML) INTRAVENOUS EVERY 8 HOURS
Status: COMPLETED | OUTPATIENT
Start: 2018-06-04 | End: 2018-06-05

## 2018-06-04 RX ORDER — ONDANSETRON 2 MG/ML
4 INJECTION INTRAMUSCULAR; INTRAVENOUS
Status: ACTIVE | OUTPATIENT
Start: 2018-06-04 | End: 2018-06-05

## 2018-06-04 RX ORDER — ACETAMINOPHEN 325 MG/1
650 TABLET ORAL EVERY 6 HOURS
Status: DISCONTINUED | OUTPATIENT
Start: 2018-06-04 | End: 2018-06-06 | Stop reason: HOSPADM

## 2018-06-04 RX ORDER — ROCURONIUM BROMIDE 10 MG/ML
INJECTION, SOLUTION INTRAVENOUS AS NEEDED
Status: DISCONTINUED | OUTPATIENT
Start: 2018-06-04 | End: 2018-06-04 | Stop reason: HOSPADM

## 2018-06-04 RX ORDER — AMOXICILLIN 250 MG
1 CAPSULE ORAL 2 TIMES DAILY
Status: DISCONTINUED | OUTPATIENT
Start: 2018-06-04 | End: 2018-06-06 | Stop reason: HOSPADM

## 2018-06-04 RX ORDER — OXYCODONE HYDROCHLORIDE 5 MG/1
10 TABLET ORAL
Status: DISCONTINUED | OUTPATIENT
Start: 2018-06-04 | End: 2018-06-06 | Stop reason: HOSPADM

## 2018-06-04 RX ORDER — HYDROMORPHONE HYDROCHLORIDE 2 MG/ML
INJECTION, SOLUTION INTRAMUSCULAR; INTRAVENOUS; SUBCUTANEOUS
Status: DISPENSED
Start: 2018-06-04 | End: 2018-06-05

## 2018-06-04 RX ORDER — GUAIFENESIN 100 MG/5ML
81 LIQUID (ML) ORAL 2 TIMES DAILY
Status: DISCONTINUED | OUTPATIENT
Start: 2018-06-04 | End: 2018-06-06 | Stop reason: HOSPADM

## 2018-06-04 RX ORDER — BUPIVACAINE HYDROCHLORIDE AND EPINEPHRINE 2.5; 5 MG/ML; UG/ML
INJECTION, SOLUTION EPIDURAL; INFILTRATION; INTRACAUDAL; PERINEURAL AS NEEDED
Status: DISCONTINUED | OUTPATIENT
Start: 2018-06-04 | End: 2018-06-04 | Stop reason: HOSPADM

## 2018-06-04 RX ORDER — SODIUM CHLORIDE 9 MG/ML
25 INJECTION, SOLUTION INTRAVENOUS CONTINUOUS
Status: DISCONTINUED | OUTPATIENT
Start: 2018-06-04 | End: 2018-06-04 | Stop reason: HOSPADM

## 2018-06-04 RX ORDER — ADHESIVE BANDAGE
30 BANDAGE TOPICAL DAILY PRN
Status: DISCONTINUED | OUTPATIENT
Start: 2018-06-05 | End: 2018-06-06 | Stop reason: HOSPADM

## 2018-06-04 RX ORDER — ONDANSETRON 2 MG/ML
4 INJECTION INTRAMUSCULAR; INTRAVENOUS AS NEEDED
Status: DISCONTINUED | OUTPATIENT
Start: 2018-06-04 | End: 2018-06-04 | Stop reason: HOSPADM

## 2018-06-04 RX ORDER — MIDAZOLAM HYDROCHLORIDE 1 MG/ML
INJECTION, SOLUTION INTRAMUSCULAR; INTRAVENOUS AS NEEDED
Status: DISCONTINUED | OUTPATIENT
Start: 2018-06-04 | End: 2018-06-04 | Stop reason: HOSPADM

## 2018-06-04 RX ORDER — SODIUM CHLORIDE 0.9 % (FLUSH) 0.9 %
5-10 SYRINGE (ML) INJECTION EVERY 8 HOURS
Status: DISCONTINUED | OUTPATIENT
Start: 2018-06-04 | End: 2018-06-04 | Stop reason: HOSPADM

## 2018-06-04 RX ORDER — SODIUM CHLORIDE 9 MG/ML
125 INJECTION, SOLUTION INTRAVENOUS CONTINUOUS
Status: DISPENSED | OUTPATIENT
Start: 2018-06-04 | End: 2018-06-05

## 2018-06-04 RX ORDER — GLYCOPYRROLATE 0.2 MG/ML
INJECTION INTRAMUSCULAR; INTRAVENOUS AS NEEDED
Status: DISCONTINUED | OUTPATIENT
Start: 2018-06-04 | End: 2018-06-04 | Stop reason: HOSPADM

## 2018-06-04 RX ORDER — MIDAZOLAM HYDROCHLORIDE 1 MG/ML
1 INJECTION, SOLUTION INTRAMUSCULAR; INTRAVENOUS AS NEEDED
Status: DISCONTINUED | OUTPATIENT
Start: 2018-06-04 | End: 2018-06-04 | Stop reason: HOSPADM

## 2018-06-04 RX ORDER — DEXAMETHASONE SODIUM PHOSPHATE 4 MG/ML
INJECTION, SOLUTION INTRA-ARTICULAR; INTRALESIONAL; INTRAMUSCULAR; INTRAVENOUS; SOFT TISSUE AS NEEDED
Status: DISCONTINUED | OUTPATIENT
Start: 2018-06-04 | End: 2018-06-04 | Stop reason: HOSPADM

## 2018-06-04 RX ORDER — MORPHINE SULFATE 10 MG/ML
2 INJECTION, SOLUTION INTRAMUSCULAR; INTRAVENOUS
Status: DISPENSED | OUTPATIENT
Start: 2018-06-04 | End: 2018-06-05

## 2018-06-04 RX ORDER — DIPHENHYDRAMINE HCL 25 MG
25 CAPSULE ORAL
Status: DISCONTINUED | OUTPATIENT
Start: 2018-06-04 | End: 2018-06-06 | Stop reason: HOSPADM

## 2018-06-04 RX ORDER — FENTANYL CITRATE 50 UG/ML
INJECTION, SOLUTION INTRAMUSCULAR; INTRAVENOUS
Status: COMPLETED
Start: 2018-06-04 | End: 2018-06-04

## 2018-06-04 RX ORDER — LIDOCAINE HYDROCHLORIDE 20 MG/ML
INJECTION, SOLUTION EPIDURAL; INFILTRATION; INTRACAUDAL; PERINEURAL AS NEEDED
Status: DISCONTINUED | OUTPATIENT
Start: 2018-06-04 | End: 2018-06-04 | Stop reason: HOSPADM

## 2018-06-04 RX ORDER — SODIUM CHLORIDE 0.9 % (FLUSH) 0.9 %
5-10 SYRINGE (ML) INJECTION EVERY 8 HOURS
Status: DISCONTINUED | OUTPATIENT
Start: 2018-06-05 | End: 2018-06-06 | Stop reason: HOSPADM

## 2018-06-04 RX ORDER — OXYCODONE HYDROCHLORIDE 5 MG/1
5 TABLET ORAL
Status: DISCONTINUED | OUTPATIENT
Start: 2018-06-04 | End: 2018-06-06 | Stop reason: HOSPADM

## 2018-06-04 RX ORDER — POLYETHYLENE GLYCOL 3350 17 G/17G
17 POWDER, FOR SOLUTION ORAL DAILY
Status: DISCONTINUED | OUTPATIENT
Start: 2018-06-05 | End: 2018-06-06 | Stop reason: HOSPADM

## 2018-06-04 RX ORDER — LIDOCAINE HYDROCHLORIDE 10 MG/ML
0.1 INJECTION, SOLUTION EPIDURAL; INFILTRATION; INTRACAUDAL; PERINEURAL AS NEEDED
Status: DISCONTINUED | OUTPATIENT
Start: 2018-06-04 | End: 2018-06-04 | Stop reason: HOSPADM

## 2018-06-04 RX ORDER — FACIAL-BODY WIPES
10 EACH TOPICAL DAILY PRN
Status: DISCONTINUED | OUTPATIENT
Start: 2018-06-06 | End: 2018-06-06 | Stop reason: HOSPADM

## 2018-06-04 RX ORDER — MORPHINE SULFATE 10 MG/ML
2 INJECTION, SOLUTION INTRAMUSCULAR; INTRAVENOUS
Status: DISCONTINUED | OUTPATIENT
Start: 2018-06-04 | End: 2018-06-04 | Stop reason: HOSPADM

## 2018-06-04 RX ORDER — SODIUM CHLORIDE 0.9 % (FLUSH) 0.9 %
5-10 SYRINGE (ML) INJECTION AS NEEDED
Status: DISCONTINUED | OUTPATIENT
Start: 2018-06-04 | End: 2018-06-06 | Stop reason: HOSPADM

## 2018-06-04 RX ORDER — PROPOFOL 10 MG/ML
INJECTION, EMULSION INTRAVENOUS AS NEEDED
Status: DISCONTINUED | OUTPATIENT
Start: 2018-06-04 | End: 2018-06-04 | Stop reason: HOSPADM

## 2018-06-04 RX ORDER — SUCCINYLCHOLINE CHLORIDE 20 MG/ML
INJECTION INTRAMUSCULAR; INTRAVENOUS AS NEEDED
Status: DISCONTINUED | OUTPATIENT
Start: 2018-06-04 | End: 2018-06-04 | Stop reason: HOSPADM

## 2018-06-04 RX ORDER — NEOSTIGMINE METHYLSULFATE 1 MG/ML
INJECTION INTRAVENOUS AS NEEDED
Status: DISCONTINUED | OUTPATIENT
Start: 2018-06-04 | End: 2018-06-04 | Stop reason: HOSPADM

## 2018-06-04 RX ORDER — DIPHENHYDRAMINE HYDROCHLORIDE 50 MG/ML
12.5 INJECTION, SOLUTION INTRAMUSCULAR; INTRAVENOUS AS NEEDED
Status: DISCONTINUED | OUTPATIENT
Start: 2018-06-04 | End: 2018-06-04 | Stop reason: HOSPADM

## 2018-06-04 RX ORDER — ONDANSETRON 4 MG/1
4 TABLET, ORALLY DISINTEGRATING ORAL
Status: DISCONTINUED | OUTPATIENT
Start: 2018-06-04 | End: 2018-06-06 | Stop reason: HOSPADM

## 2018-06-04 RX ORDER — MIDAZOLAM HYDROCHLORIDE 1 MG/ML
0.5 INJECTION, SOLUTION INTRAMUSCULAR; INTRAVENOUS
Status: DISCONTINUED | OUTPATIENT
Start: 2018-06-04 | End: 2018-06-04 | Stop reason: HOSPADM

## 2018-06-04 RX ORDER — HYDROMORPHONE HYDROCHLORIDE 1 MG/ML
0.5 INJECTION, SOLUTION INTRAMUSCULAR; INTRAVENOUS; SUBCUTANEOUS
Status: DISCONTINUED | OUTPATIENT
Start: 2018-06-04 | End: 2018-06-04 | Stop reason: HOSPADM

## 2018-06-04 RX ORDER — FENTANYL CITRATE 50 UG/ML
50 INJECTION, SOLUTION INTRAMUSCULAR; INTRAVENOUS AS NEEDED
Status: DISCONTINUED | OUTPATIENT
Start: 2018-06-04 | End: 2018-06-04 | Stop reason: HOSPADM

## 2018-06-04 RX ORDER — SODIUM CHLORIDE, SODIUM LACTATE, POTASSIUM CHLORIDE, CALCIUM CHLORIDE 600; 310; 30; 20 MG/100ML; MG/100ML; MG/100ML; MG/100ML
25 INJECTION, SOLUTION INTRAVENOUS CONTINUOUS
Status: DISCONTINUED | OUTPATIENT
Start: 2018-06-04 | End: 2018-06-04 | Stop reason: HOSPADM

## 2018-06-04 RX ADMIN — PROPOFOL 50 MG: 10 INJECTION, EMULSION INTRAVENOUS at 12:37

## 2018-06-04 RX ADMIN — ACETAMINOPHEN 650 MG: 325 TABLET ORAL at 05:09

## 2018-06-04 RX ADMIN — OXYCODONE HYDROCHLORIDE 10 MG: 5 TABLET ORAL at 22:36

## 2018-06-04 RX ADMIN — Medication 2 G: at 11:55

## 2018-06-04 RX ADMIN — PROPOFOL 200 MG: 10 INJECTION, EMULSION INTRAVENOUS at 11:49

## 2018-06-04 RX ADMIN — FENTANYL CITRATE 100 MCG: 50 INJECTION, SOLUTION INTRAMUSCULAR; INTRAVENOUS at 11:49

## 2018-06-04 RX ADMIN — DIPHENHYDRAMINE HYDROCHLORIDE 25 MG: 25 CAPSULE ORAL at 19:42

## 2018-06-04 RX ADMIN — FENTANYL CITRATE 25 MCG: 50 INJECTION, SOLUTION INTRAMUSCULAR; INTRAVENOUS at 14:13

## 2018-06-04 RX ADMIN — ACETAMINOPHEN 650 MG: 325 TABLET ORAL at 00:11

## 2018-06-04 RX ADMIN — DEXAMETHASONE SODIUM PHOSPHATE 8 MG: 4 INJECTION, SOLUTION INTRA-ARTICULAR; INTRALESIONAL; INTRAMUSCULAR; INTRAVENOUS; SOFT TISSUE at 12:13

## 2018-06-04 RX ADMIN — ACETAMINOPHEN 650 MG: 325 TABLET ORAL at 16:32

## 2018-06-04 RX ADMIN — MORPHINE SULFATE 2 MG: 10 INJECTION INTRAVENOUS at 16:34

## 2018-06-04 RX ADMIN — OXYCODONE HYDROCHLORIDE 10 MG: 5 TABLET ORAL at 15:05

## 2018-06-04 RX ADMIN — Medication 5 ML: at 22:35

## 2018-06-04 RX ADMIN — NEOSTIGMINE METHYLSULFATE 2 MG: 1 INJECTION INTRAVENOUS at 12:48

## 2018-06-04 RX ADMIN — OXYCODONE HYDROCHLORIDE 10 MG: 5 TABLET ORAL at 05:01

## 2018-06-04 RX ADMIN — HYDROMORPHONE HYDROCHLORIDE 0.5 MG: 1 INJECTION, SOLUTION INTRAMUSCULAR; INTRAVENOUS; SUBCUTANEOUS at 13:43

## 2018-06-04 RX ADMIN — Medication 2 G: at 19:42

## 2018-06-04 RX ADMIN — ROCURONIUM BROMIDE 20 MG: 10 INJECTION, SOLUTION INTRAVENOUS at 11:58

## 2018-06-04 RX ADMIN — OXYCODONE HYDROCHLORIDE 10 MG: 5 TABLET ORAL at 18:29

## 2018-06-04 RX ADMIN — FENTANYL CITRATE 50 MCG: 50 INJECTION, SOLUTION INTRAMUSCULAR; INTRAVENOUS at 12:28

## 2018-06-04 RX ADMIN — ROCURONIUM BROMIDE 10 MG: 10 INJECTION, SOLUTION INTRAVENOUS at 11:49

## 2018-06-04 RX ADMIN — OXYCODONE HYDROCHLORIDE 10 MG: 5 TABLET ORAL at 01:22

## 2018-06-04 RX ADMIN — MIDAZOLAM HYDROCHLORIDE 2 MG: 1 INJECTION, SOLUTION INTRAMUSCULAR; INTRAVENOUS at 11:42

## 2018-06-04 RX ADMIN — GLYCOPYRROLATE 0.3 MG: 0.2 INJECTION INTRAMUSCULAR; INTRAVENOUS at 12:48

## 2018-06-04 RX ADMIN — ASPIRIN 81 MG 81 MG: 81 TABLET ORAL at 18:29

## 2018-06-04 RX ADMIN — SODIUM CHLORIDE, POTASSIUM CHLORIDE, SODIUM LACTATE AND CALCIUM CHLORIDE: 600; 310; 30; 20 INJECTION, SOLUTION INTRAVENOUS at 11:25

## 2018-06-04 RX ADMIN — SUCCINYLCHOLINE CHLORIDE 160 MG: 20 INJECTION INTRAMUSCULAR; INTRAVENOUS at 11:49

## 2018-06-04 RX ADMIN — SODIUM CHLORIDE 125 ML/HR: 900 INJECTION, SOLUTION INTRAVENOUS at 13:42

## 2018-06-04 RX ADMIN — FENTANYL CITRATE 25 MCG: 50 INJECTION, SOLUTION INTRAMUSCULAR; INTRAVENOUS at 14:05

## 2018-06-04 RX ADMIN — DOCUSATE SODIUM AND SENNOSIDES 2 TABLET: 8.6; 5 TABLET, FILM COATED ORAL at 09:00

## 2018-06-04 RX ADMIN — OXYCODONE HYDROCHLORIDE 10 MG: 5 TABLET ORAL at 09:00

## 2018-06-04 RX ADMIN — Medication 5 ML: at 05:05

## 2018-06-04 RX ADMIN — ONDANSETRON 4 MG: 2 INJECTION INTRAMUSCULAR; INTRAVENOUS at 12:42

## 2018-06-04 RX ADMIN — LIDOCAINE HYDROCHLORIDE 80 MG: 20 INJECTION, SOLUTION EPIDURAL; INFILTRATION; INTRACAUDAL; PERINEURAL at 11:49

## 2018-06-04 RX ADMIN — SODIUM CHLORIDE 125 ML/HR: 900 INJECTION, SOLUTION INTRAVENOUS at 22:36

## 2018-06-04 RX ADMIN — SODIUM CHLORIDE, POTASSIUM CHLORIDE, SODIUM LACTATE AND CALCIUM CHLORIDE: 600; 310; 30; 20 INJECTION, SOLUTION INTRAVENOUS at 12:56

## 2018-06-04 NOTE — PROGRESS NOTES
TRANSFER - IN REPORT:    Verbal report received from Ole(name) on Zenon Stroud  being received from [employer]) for routine post - op      Report consisted of patients Situation, Background, Assessment and   Recommendations(SBAR). Information from the following report(s) SBAR, Kardex, Intake/Output, MAR and Recent Results was reviewed with the receiving nurse. Opportunity for questions and clarification was provided. Assessment completed upon patients arrival to unit and care assumed.

## 2018-06-04 NOTE — PROGRESS NOTES
Patient signed with  that he had drank 4 ounces of ginger ale with his pain medication this am.  Pain medication given at 0900. Dr. Yudy Saul made aware.

## 2018-06-04 NOTE — PROGRESS NOTES
Patient arrived to room 3218. He refused fo me to assess his skin on his back. He also refused the SCD machine. . Patient refused for nurse to call for a nicotine patch. Will continue to monitor     Patient is requesting a brace. He seems very angry. Tried to explain to him that a brace was not ordered for patient. Patient requested to speak with the doctor. Unable to reach the doctor. Called and Spoke with Cheryl Burgess.  She stated she would be around later so she could devote the time to explain everything with the patient

## 2018-06-04 NOTE — PROGRESS NOTES
Bedside and Verbal shift change report given to [address] [place] [address] (oncoming nurse) by Criss Mckeon (offgoing nurse). Report included the following information SBAR, Kardex, Procedure Summary, MAR and Recent Results.

## 2018-06-04 NOTE — PERIOP NOTES
TRANSFER - IN REPORT:    Verbal report received from TACOS Zavaleta on Akiko Richardson  being received from [phone] for ordered procedure      Report consisted of patients Situation, Background, Assessment and   Recommendations(SBAR). Information from the following report(s) SBAR, Kardex, Intake/Output, MAR and Recent Results was reviewed with the receiving nurse. Opportunity for questions and clarification was provided. Assessment completed upon patients arrival to unit and care assumed.

## 2018-06-04 NOTE — ANESTHESIA POSTPROCEDURE EVALUATION
Post-Anesthesia Evaluation and Assessment    Patient: Ara Arroyo MRN: [id number]  SSN: [id number]    YOB: 1985  Age: 27 y.o. Sex: male       Cardiovascular Function/Vital Signs  Visit Vitals    /89 (BP 1 Location: Left arm, BP Patient Position: At rest)    Pulse 65    Temp 36.9 °C (98.4 °F)    Resp 9    Ht 5' 4\" (1.626 m)    Wt 56.7 kg (125 lb)    SpO2 94%    BMI 21.46 kg/m2       Patient is status post general anesthesia for Procedure(s):  TIBIA INSERTION INTRA MEDULLARY NAIL. Nausea/Vomiting: None    Postoperative hydration reviewed and adequate. Pain:  Pain Scale 1: Numeric (0 - 10) (06/04/18 1413)  Pain Intensity 1: 4 (06/04/18 1413)   Managed    Neurological Status:   Neuro (WDL): Within Defined Limits (06/04/18 1308)  Neuro  LLE Motor Response: Other(comment) (WDL) (06/04/18 1112)  RLE Motor Response: Weak (broken tibia) (06/04/18 1112)   At baseline    Mental Status and Level of Consciousness: Arousable    Pulmonary Status:   O2 Device: Nasal cannula (06/04/18 1400)   Adequate oxygenation and airway patent    Complications related to anesthesia: None    Post-anesthesia assessment completed.  No concerns    Signed By: Joanne Moon MD     June 4, 2018

## 2018-06-04 NOTE — PERIOP NOTES
Handoff Report from Operating Room to PACU    Report received from [facility]  and [address] [place] [address]  regarding Noemi Hargrove. Surgeon(s):  Sánchez Dacosta MD  And Procedure(s) (LRB):  TIBIA INSERTION INTRA MEDULLARY NAIL (Right)  confirmed   with allergies and dressings discussed. Anesthesia type, drugs, patient history, complications, estimated blood loss, vital signs, intake and output, and last pain medication and reversal medications were reviewed.

## 2018-06-04 NOTE — PERIOP NOTES
Patient: Francisco Araya MRN: [id number]  SSN: [id number]   YOB: 1985  Age: 27 y.o. Sex: male     Patient is status post Procedure(s):  TIBIA INSERTION INTRA MEDULLARY NAIL. Surgeon(s) and Role:     * Jameel Law MD - Primary    Local/Dose/Irrigation:  See [facility]                  Peripheral IV 06/03/18 Right Forearm (Active)   Site Assessment Clean, dry, & intact 6/4/2018 11:24 AM   Phlebitis Assessment 0 6/4/2018 11:24 AM   Infiltration Assessment 0 6/4/2018 11:24 AM   Dressing Status Clean, dry, & intact 6/4/2018 11:24 AM   Dressing Type Tape;Transparent 6/4/2018 11:24 AM   Hub Color/Line Status Pink; Infusing 6/4/2018 11:24 AM                           Dressing/Packing:  Wound Leg Right-DRESSING TYPE: Elastic bandage (06/03/18 2050)  Wound Leg Right-DRESSING TYPE: Other (Comment); Staples;ABD pad;4 x 4 (honeycomb) (06/04/18 1100)  Wound Knee Anterior-DRESSING TYPE: Staples; Other (Comment) (honeycomb) (06/04/18 1100)  Splint/Cast: Wound Leg Right-SPLINT TYPE/MATERIAL:  (splint)]    Other:  None

## 2018-06-04 NOTE — PROGRESS NOTES
Reason for Admission:  Tibia/fibula fracture; shaft; right ankle fracture;          RRAT Score:   9              Plan for utilizing home health:   As recommended                 Likelihood of Readmission:  LOW as per RRAT score                   Transition of Care Plan:      Pt is a 26 y/o [place] male admitted for Tibia/fibula fracture; shaft; right ankle fracture; Pt is deaf and CM used pen and paper to complete initial assessment. Pt lives in a two story home with 14-steps to his bedroom in the residence. Pt has two steps to the entrance of the residence. Pt is independent with ADLs not to include driving. Pt has no previous HH, SNF or DME providers. Pt prefers to use [facility]. Pt will require transportation via [employer] at discharge. CM met with pt to complete initial assessment. Pt is alert and oriented to person, place,time and situation. CM will continue to follow pt to assist with discharge plans as needed. CM attempted to add pt's PCP to electronic chart but the provider could not be found under the listing for PCP. Pt's PCP is Dr. Skyler Garcia ([phone]). Care Management Interventions  PCP Verified by CM: Yes (Niecy Batista ([phone]) )  Mode of Transport at Discharge:  Other (see comment) ([employer] )  Transition of Care Consult (CM Consult): Discharge Planning  Discharge Durable Medical Equipment: No  Health Maintenance Reviewed: Yes  Physical Therapy Consult: No  Occupational Therapy Consult: No  Speech Therapy Consult: No  Confirm Follow Up Transport: Family  Plan discussed with Pt/Family/Caregiver: Yes  Discharge Location  Discharge Placement:  (TBD)    [place] [place], [address] [place] [address]   [phone]

## 2018-06-04 NOTE — ANESTHESIA PREPROCEDURE EVALUATION
Anesthetic History   No history of anesthetic complications            Review of Systems / Medical History  Patient summary reviewed, nursing notes reviewed and pertinent labs reviewed    Pulmonary          Smoker         Neuro/Psych   Within defined limits           Cardiovascular  Within defined limits                Exercise tolerance: >4 METS     GI/Hepatic/Renal  Within defined limits              Endo/Other  Within defined limits           Other Findings            Physical Exam    Airway  Mallampati: II  TM Distance: 4 - 6 cm  Neck ROM: normal range of motion   Mouth opening: Normal     Cardiovascular  Regular rate and rhythm,  S1 and S2 normal,  no murmur, click, rub, or gallop             Dental  No notable dental hx       Pulmonary  Breath sounds clear to auscultation               Abdominal  GI exam deferred       Other Findings            Anesthetic Plan    ASA: 2  Anesthesia type: general          Induction: Intravenous  Anesthetic plan and risks discussed with: Patient      rsi

## 2018-06-04 NOTE — PERIOP NOTES
11:20= [employer] Webcam  used for communication. 11:25= consent signed and verified with patient and web cam .

## 2018-06-04 NOTE — PERIOP NOTES
1305 Bleeding noted through surgical elastic dressing. Notified TY Naidu of bleeding at surgical site on right ankle. PA was okay with this and stated to place bundle new 4x4, ABD pad and new elastic bandage on site.

## 2018-06-05 LAB
ANION GAP SERPL CALC-SCNC: 7 MMOL/L (ref 5–15)
BUN SERPL-MCNC: 7 MG/DL (ref 6–20)
BUN/CREAT SERPL: 9 (ref 12–20)
CALCIUM SERPL-MCNC: 8.9 MG/DL (ref 8.5–10.1)
CHLORIDE SERPL-SCNC: 99 MMOL/L (ref 97–108)
CO2 SERPL-SCNC: 28 MMOL/L (ref 21–32)
CREAT SERPL-MCNC: 0.8 MG/DL (ref 0.7–1.3)
GLUCOSE SERPL-MCNC: 110 MG/DL (ref 65–100)
HGB BLD-MCNC: 13 G/DL (ref 12.1–17)
POTASSIUM SERPL-SCNC: 4.2 MMOL/L (ref 3.5–5.1)
SODIUM SERPL-SCNC: 134 MMOL/L (ref 136–145)

## 2018-06-05 PROCEDURE — G8979 MOBILITY GOAL STATUS: HCPCS

## 2018-06-05 PROCEDURE — G8988 SELF CARE GOAL STATUS: HCPCS | Performed by: OCCUPATIONAL THERAPIST

## 2018-06-05 PROCEDURE — 74011250637 HC RX REV CODE- 250/637: Performed by: PHYSICIAN ASSISTANT

## 2018-06-05 PROCEDURE — 74011250636 HC RX REV CODE- 250/636: Performed by: ORTHOPAEDIC SURGERY

## 2018-06-05 PROCEDURE — G8978 MOBILITY CURRENT STATUS: HCPCS

## 2018-06-05 PROCEDURE — 36415 COLL VENOUS BLD VENIPUNCTURE: CPT | Performed by: PHYSICIAN ASSISTANT

## 2018-06-05 PROCEDURE — 65270000029 HC RM PRIVATE

## 2018-06-05 PROCEDURE — 80048 BASIC METABOLIC PNL TOTAL CA: CPT | Performed by: PHYSICIAN ASSISTANT

## 2018-06-05 PROCEDURE — G8987 SELF CARE CURRENT STATUS: HCPCS | Performed by: OCCUPATIONAL THERAPIST

## 2018-06-05 PROCEDURE — 97161 PT EVAL LOW COMPLEX 20 MIN: CPT

## 2018-06-05 PROCEDURE — 97165 OT EVAL LOW COMPLEX 30 MIN: CPT | Performed by: OCCUPATIONAL THERAPIST

## 2018-06-05 PROCEDURE — 74011250636 HC RX REV CODE- 250/636: Performed by: PHYSICIAN ASSISTANT

## 2018-06-05 PROCEDURE — 85018 HEMOGLOBIN: CPT | Performed by: PHYSICIAN ASSISTANT

## 2018-06-05 RX ADMIN — CALCIUM CARBONATE-VITAMIN D TAB 500 MG-200 UNIT 1 TABLET: 500-200 TAB at 12:41

## 2018-06-05 RX ADMIN — ACETAMINOPHEN 650 MG: 325 TABLET ORAL at 01:49

## 2018-06-05 RX ADMIN — MORPHINE SULFATE 2 MG: 10 INJECTION INTRAVENOUS at 09:06

## 2018-06-05 RX ADMIN — Medication 5 ML: at 06:11

## 2018-06-05 RX ADMIN — OXYCODONE HYDROCHLORIDE 10 MG: 5 TABLET ORAL at 22:15

## 2018-06-05 RX ADMIN — MORPHINE SULFATE 2 MG: 10 INJECTION INTRAVENOUS at 01:52

## 2018-06-05 RX ADMIN — Medication 2 G: at 06:12

## 2018-06-05 RX ADMIN — OXYCODONE HYDROCHLORIDE 10 MG: 5 TABLET ORAL at 02:36

## 2018-06-05 RX ADMIN — OXYCODONE HYDROCHLORIDE 10 MG: 5 TABLET ORAL at 18:24

## 2018-06-05 RX ADMIN — ACETAMINOPHEN 650 MG: 325 TABLET ORAL at 18:25

## 2018-06-05 RX ADMIN — ACETAMINOPHEN 650 MG: 325 TABLET ORAL at 06:10

## 2018-06-05 RX ADMIN — OXYCODONE HYDROCHLORIDE 10 MG: 5 TABLET ORAL at 10:19

## 2018-06-05 RX ADMIN — STANDARDIZED SENNA CONCENTRATE AND DOCUSATE SODIUM 1 TABLET: 8.6; 5 TABLET, FILM COATED ORAL at 09:05

## 2018-06-05 RX ADMIN — OXYCODONE HYDROCHLORIDE 10 MG: 5 TABLET ORAL at 06:10

## 2018-06-05 RX ADMIN — MORPHINE SULFATE 2 MG: 10 INJECTION INTRAVENOUS at 12:41

## 2018-06-05 RX ADMIN — ASPIRIN 81 MG 81 MG: 81 TABLET ORAL at 09:06

## 2018-06-05 RX ADMIN — CALCIUM CARBONATE-VITAMIN D TAB 500 MG-200 UNIT 1 TABLET: 500-200 TAB at 09:06

## 2018-06-05 RX ADMIN — ACETAMINOPHEN 650 MG: 325 TABLET ORAL at 12:41

## 2018-06-05 RX ADMIN — CALCIUM CARBONATE-VITAMIN D TAB 500 MG-200 UNIT 1 TABLET: 500-200 TAB at 16:08

## 2018-06-05 RX ADMIN — OXYCODONE HYDROCHLORIDE 10 MG: 5 TABLET ORAL at 14:39

## 2018-06-05 NOTE — PROGRESS NOTES
ORTHO - Progress Note  Post Op day: 1 Day [address]     [id number]  male    27 y.o.    1985    Admit date:  6/3/2018  Date of Surgery:  2018   Procedures:  Procedure(s):  TIBIA INSERTION INTRA MEDULLARY NAIL  Admitting Physician: Niya Wilkerson MD   Surgeon:  Linda Barcenas) and Role:   * Wendy Nicholson MD - Primary      SUBJECTIVE:     Ángel Hall is a 27 y.o. male s/p a right TIBIA INTRA MEDULLARY NAIL resting in the bed. Patient has complaints of appropriate post pain. Reported to RN- some right foot numbness resolved after loosening the ACE wrap. OBJECTIVE:       Physical Exam:  General: alert, cooperative, no distress. Deaf   Gastrointestinal:  Soft, non-distended. Cardiovascular: equal pulses in the lower extremities,  Brisk cap refill in all distal extremities   Genitourinary: Voiding independently   Respiratory: No respiratory distress   Neurological:Neurovascular exam within normal limits. Senstion intact: LE.    Motor: + DF/PF/EHL. Musculoskeletal: Agus's sign negative in bilateral lower extremities. Calves soft, supple, non-tender upon palpation or with passive stretch. Dressing/Wound:  Clean, dry and intact. No significant erythema or swelling.     Vital Signs:       Patient Vitals for the past 8 hrs:   BP Temp Pulse Resp SpO2   18 0801 (!) 147/99 98.2 °F (36.8 °C) 82 18 98 %                                          Temp (24hrs), Av.4 °F (36.9 °C), Min:98.2 °F (36.8 °C), Max:98.7 °F (37.1 °C)      Date 18 0700 - 18 0659   Shift 3021-1762 5678-0614 3896-1699 24 Hour Total   I  N  T  A  K  E   Shift Total  (mL/kg)       O  U  T  P  U  T   Urine  (mL/kg/hr) 1350   1350    Shift Total  (mL/kg) 1350  (23.8)   1350  (23.8)   Weight (kg) 56.7 56.7 56.7 56.7     Labs:        Recent Labs      18   0155  18   0947   HCT   --   44.9   HGB  13.0  15.7   INR   --   0.9     PT/OT:              ASSESSMENT / PLAN:   Active Problems:    Tibia/fibula fracture, shaft (6/3/2018)     ASA 81mg BID  NWB on the RLE  Ambulate with cruches, encourage ankle and knee ROM  Likely DC weds    Signed By: Kerline Segundo PA-C

## 2018-06-05 NOTE — PROGRESS NOTES
Patient called me into his room and asked me if I could go to the store and buy e-cigarettes for him.  Updated him that I could not do this request for him

## 2018-06-05 NOTE — PROGRESS NOTES
Problem: Self Care Deficits Care Plan (Adult)  Goal: *Acute Goals and Plan of Care (Insert Text)  Occupational Therapy Goals:  Initiated 6/5/2018  1. Patient will perform grooming standing adhering to WB status with modified independence within 7 days. 2. Patient will perform toileting with modified independence within 7 days. 3. Patient will perform lower body dressing with modified independence within 7 days. 4. Patient will transfer from toilet with modified independence using the least restrictive device and appropriate durable medical equipment within 7 days. Occupational Therapy EVALUATION  Patient: Ulises Islam (52 y.o. male)  Date: 6/5/2018  Primary Diagnosis: Tibia/fibula fracture, shaft  right ankle fracture  Procedure(s) (LRB):  TIBIA INSERTION INTRA MEDULLARY NAIL (Right) 1 Day Post-Op   Precautions:   NWB (RLE)    ASSESSMENT :  Based on the objective data described below, the patient presents with impulsivity and was seated edge of bed with walker upon arrival attempting to get up. Pt had significant pain on first attempt at eval in the AM and nursing adjusted the ace wrapping on his leg. Pt is deaf but had his hearing aids today. It was difficult at times during functional tasks to get pt to pay attention to safety training or education due to impulsivity and [place]. CGA for sit to stand from edge of bed with demo on how to maintain NWB with sit to stand. Pt hopped 8 feet with RW for support with CGA around room with increased effort, time and was tremulous at times. Once returned to edge pt needed max cues to safely manage walker and LE to maintain NWB. Limited ability for pt to reach right foot due to pain. Pt is performing UB ADLs at a set up level and lower body ADLS at a min assist level. Pts main limitations is stairs, decreased balance and safety.   Pending progress may need Inpatient rehab     Patient will benefit from skilled intervention to address the above impairments. Patients rehabilitation potential is considered to be Good  Factors which may influence rehabilitation potential include:   [x]             None noted  []             Mental ability/status  []             Medical condition  []             Home/family situation and support systems  []             Safety awareness  []             Pain tolerance/management  []             Other:      PLAN :  Recommendations and Planned Interventions:  [x]               Self Care Training                  [x]        Therapeutic Activities  [x]               Functional Mobility Training    []        Cognitive Retraining  [x]               Therapeutic Exercises           []        Endurance Activities  [x]               Balance Training                   []        Neuromuscular Re-Education  []               Visual/Perceptual Training     [x]   Home Safety Training  [x]               Patient Education                 [x]        Family Training/Education  []               Other (comment):    Frequency/Duration: Patient will be followed by occupational therapy 4 times a week to address goals. Discharge Recommendations: Rehab vs. Home health pending pts ability to manage stairs  Further Equipment Recommendations for Discharge: bedside commode and rolling walker     SUBJECTIVE:   Patient stated I can't walk.     OBJECTIVE DATA SUMMARY:   HISTORY:   Past Medical History:   Diagnosis Date    Deafness      Past Surgical History:   Procedure Laterality Date    HX ORTHOPAEDIC Left     left leg       Prior Level of Function/Environment/Context: independent without assist devices; has two young children at home and lives with his girlfriend in a home that was built in 5.   Only bathroom is on the second floor of the home and at least 18 steps to get to second level of home; [place] home but no rails  Occupations in which the patient is/was successful, what are the barriers preventing that success:   Performance Patterns (routines, roles, habits, and rituals):   Personal Interests and/or values:   Expanded or extensive additional review of patient history:     Home Situation  Home Environment: Private residence  # Steps to Enter: 2  Rails to Enter: No  Wheelchair Ramp: Yes  One/Two Story Residence: Two story  # of Interior Steps: 25  Interior Rails: Both  Living Alone: Yes  Support Systems: Child(abi), Spouse/Significant Other/Partner  Patient Expects to be Discharged to[de-identified] Private residence  Current DME Used/Available at Home: None  Tub or Shower Type: Tub/Shower combination    Hand dominance: Right    EXAMINATION OF PERFORMANCE DEFICITS:  Cognitive/Behavioral Status:  Neurologic State: Alert  Orientation Level: Oriented X4  Cognition: Follows commands; Impulsive (decreased safety awareness)  Perception: Appears intact  Perseveration: No perseveration noted  Safety/Judgement: Fall prevention;Home safety      Hearing: Auditory  Auditory Impairment: Deaf  Hearing Aids/Status: Right (battery dead; not in Pre Op)    Vision/Perceptual:    Tracking: Able to track stimulus in all quadrants w/o difficulty                      Acuity: Within Defined Limits         Range of Motion:    AROM: Within functional limits (operative LE not tested)                         Strength:    Strength: Generally decreased, functional                Coordination:  Coordination: Within functional limits  Fine Motor Skills-Upper: Left Intact; Right Intact    Gross Motor Skills-Upper: Left Intact; Right Intact    Tone & Sensation:    Tone: Normal  Sensation: Intact                      Balance:  Sitting: Intact  Standing: Impaired  Standing - Static: Constant support; Fair  Standing - Dynamic : Fair    Functional Mobility and Transfers for ADLs:  Bed Mobility:  Rolling: Supervision  Supine to Sit: Minimum assistance (operative LE back to bed)  Scooting: Contact guard assistance    Transfers:  Sit to Stand: Contact guard assistance  Stand to Sit: Contact guard assistance  Bed to Chair: Contact guard assistance  Toilet Transfer : Contact guard assistance    ADL Assessment:  Feeding: Independent    Oral Facial Hygiene/Grooming: Setup    Bathing: Minimum assistance    Upper Body Dressing: Setup    Lower Body Dressing: Minimum assistance    Toileting: Minimum assistance    Cognitive Retraining  Safety/Judgement: Fall prevention;Home safety    Functional Measure:  Barthel Index:    Bathin  Bladder: 10  Bowels: 10  Groomin  Dressin  Feeding: 10  Mobility: 0  Stairs: 0  Toilet Use: 5  Transfer (Bed to Chair and Back): 10  Total: 55       Barthel and G-code impairment scale:  Percentage of impairment CH  0% CI  1-19% CJ  20-39% CK  40-59% CL  60-79% CM  80-99% CN  100%   Barthel Score 0-100 100 99-80 79-60 59-40 20-39 1-19   0   Barthel Score 0-20 20 17-19 13-16 9-12 5-8 1-4 0      The Barthel ADL Index: Guidelines  1. The index should be used as a record of what a patient does, not as a record of what a patient could do. 2. The main aim is to establish degree of independence from any help, physical or verbal, however minor and for whatever reason. 3. The need for supervision renders the patient not independent. 4. A patient's performance should be established using the best available evidence. Asking the patient, friends/relatives and nurses are the usual sources, but direct observation and common sense are also important. However direct testing is not needed. 5. Usually the patient's performance over the preceding 24-48 hours is important, but occasionally longer periods will be relevant. 6. Middle categories imply that the patient supplies over 50 per cent of the effort. 7. Use of aids to be independent is allowed. Jeimy Brown, Barthel, D.W. (2563). Functional evaluation: the Barthel Index. [address] [facility] (14)2. Meg Grace davon MONROE PichardoF, Nannette Flanagan., [place], [address] ().  Measuring the change indisability after inpatient rehabilitation; comparison of the responsiveness of the Barthel Index and Functional [place] Measure. Journal of Neurology, Neurosurgery, and Psychiatry, 66(4), 402-768. PHIL Quesada, RHONDA Palm, & Wang Marie M.A. (2004.) Assessment of post-stroke quality of life in cost-effectiveness studies: The usefulness of the Barthel Index and the EuroQoL-5D. Quality of Life Research, 13, 934-68         G codes: In compliance with CMSs Claims Based Outcome Reporting, the following G-code set was chosen for this patient based on their primary functional limitation being treated: The outcome measure chosen to determine the severity of the functional limitation was the barthel with a score of 55/100 which was correlated with the impairment scale. ? Self Care:     - CURRENT STATUS: CK - 40%-59% impaired, limited or restricted    - GOAL STATUS: CJ - 20%-39% impaired, limited or restricted    - D/C STATUS:  ---------------To be determined---------------     Occupational Therapy Evaluation Charge Determination   History Examination Decision-Making   LOW Complexity : Brief history review  LOW Complexity : 1-3 performance deficits relating to physical, cognitive , or psychosocial skils that result in activity limitations and / or participation restrictions  LOW Complexity : No comorbidities that affect functional and no verbal or physical assistance needed to complete eval tasks       Based on the above components, the patient evaluation is determined to be of the following complexity level: LOW   Pain:  Pain Scale 1: Numeric (0 - 10)  Pain Intensity 1: 3  Pain Location 1: Knee  Pain Orientation 1: Left  Pain Description 1: Aching  Pain Intervention(s) 1: Medication (see MAR); Elevation; Emotional support;Ice;Repositioned  Activity Tolerance:     Please refer to the flowsheet for vital signs taken during this treatment.   After treatment:   [x] Patient left in no apparent distress sitting up in chair  [] Patient left in no apparent distress in bed  [x] Call bell left within reach  [x] Nursing notified  [] Caregiver present  [] Bed alarm activated    COMMUNICATION/EDUCATION:   The patients plan of care was discussed with: Physical Therapist, Registered Nurse and patient. [x] Home safety education was provided and the patient/caregiver indicated understanding. [x] Patient have participated as able in goal setting and plan of care. [x] Patient agree to work toward stated goals and plan of care. [] Patient understands intent and goals of therapy, but is neutral about his/her participation. [] Patient is unable to participate in goal setting and plan of care. This patients plan of care is appropriate for delegation to [facility].     Thank you for this referral.  Kenton Lee OTR/L  Time Calculation: 8 mins

## 2018-06-05 NOTE — PROGRESS NOTES
Physical Therapy Goals  Initiated 6/5/2018  1. Patient will move from supine to sit and sit to supine , scoot up and down and roll side to side in bed with independence within 7 day(s). 2. Patient will transfer from bed to chair and chair to bed with modified independence using the least restrictive device within 7 day(s). 3. Patient will perform sit to stand with independence within 7 day(s). 4. Patient will ambulate with modified independence for 656 feet with the least restrictive device within 7 day(s). 5. Patient will ascend/descend 12 stairs with dual handrail(s) with modified independence within 7 day(s). physical Therapy EVALUATION  Patient: Demetria Yan (40 y.o. male)  Date: 6/5/2018  Primary Diagnosis: Tibia/fibula fracture, shaft  right ankle fracture  Procedure(s) (LRB):  TIBIA INSERTION INTRA MEDULLARY NAIL (Right) 1 Day Post-Op   Precautions:   Fall, NWB (patient very impulsive, and deaf (reads lips, hearing aids))    ASSESSMENT :  Based on the objective data described below, the patient presents with impaired safety awareness and high pain levels s/p procedure combined with NWB status impacting functional mobility. Patient also deaf (reads lips, has hearing aid, can hear if speaks loudly and slowly) but independent prior. Received standing at bedside, with patient holding onto computer with L hand adjacent to bed stating he had been 'down too long'. With [facility] encouragement, he sat down where introduced self and activity desired. Patient immediately reported lack of ability to use RW device, whereby axillary crutches obtained to utilize and patients demeanor much improved. He initially stood with crutches in poor position, with patient avoiding initial flexion of R knee to assist in sitting>standing as he reports significant pain.  Following initial poor use of crutches with heavy lean onto devices, he self correct and utilized at times an accelerated swing-to gait pattern to navigate. Upon return to room, notable increase in tremors of BUE and LLE likely 2/2 fatigue and pain with patient unsafely lifting and hiking LLE to place limb first into bed rather than sitting first with hips. Patient then educated on avoidance of this, but continued to reiterate pain and desire to avoid R knee flexion as cause of unsafe action. Verbally/visually reviewed safe sit<>stand with crutches to understanding. Patient should make progress in above deficits with follow up therapy visits; he will certainly need to practice steps prior to DC given multiple steps to enter the home without handrails. He will need to utilize crutches for this vs 'bump' up steps with assessment to be determined and appropriate likely next session. Home therapy should be appropriate at DC alongside family support. Patient will benefit from skilled intervention to address the above impairments. Patients rehabilitation potential is considered to be Fair  Factors which may influence rehabilitation potential include:   []         None noted  []         Mental ability/status  []         Medical condition  [x]         Home/family situation and support systems  [x]         Safety awareness  [x]         Pain tolerance/management  []         Other:      PLAN :  Recommendations and Planned Interventions:  [x]           Bed Mobility Training             []    Neuromuscular Re-Education  [x]           Transfer Training                   []    Orthotic/Prosthetic Training  [x]           Gait Training                         []    Modalities  [x]           Therapeutic Exercises           []    Edema Management/Control  [x]           Therapeutic Activities            [x]    Patient and Family Training/Education  []           Other (comment):    Frequency/Duration: Patient will be followed by physical therapy  daily to address goals.   Discharge Recommendations: Home Health  Further Equipment Recommendations for Discharge: axillary crutches      SUBJECTIVE:   Patient stated I can't use that walker.     OBJECTIVE DATA SUMMARY:   HISTORY:    Past Medical History:   Diagnosis Date    Deafness      Past Surgical History:   Procedure Laterality Date    HX ORTHOPAEDIC Left     left leg     Prior Level of Function/Home Situation: Independent community ambulator. He states that he's injured his LLE prior, resulting in similar NWB status and use of crutches which he no longer owns. He lives at home with his girlfriend (who per chart is also deaf) and children. Personal factors and/or comorbidities impacting plan of care: deaf (reads lips, has hearing aid, can hear if speaks loudly and slowly)     Home Situation  Home Environment: Private residence  # Steps to Enter: 2  Rails to Enter: No  Wheelchair Ramp: Yes  One/Two Story Residence: Two story  # of Interior Steps: 18  Interior Rails: Both  Living Alone: Yes  Support Systems: Child(abi), Spouse/Significant Other/Partner  Patient Expects to be Discharged to[de-identified] Private residence  Current DME Used/Available at Home: None  Tub or Shower Type: Tub/Shower combination    EXAMINATION/PRESENTATION/DECISION MAKING:   Critical Behavior:  Neurologic State: Alert  Orientation Level: Oriented X4  Cognition: Follows commands, Impulsive (decreased safety awareness)  Safety/Judgement: Fall prevention, Home safety  Hearing:   Auditory  Auditory Impairment: Deaf  Hearing Aids/Status: Right (battery dead; not in Pre Op)  Range Of Motion:  AROM: Within functional limits (operative LE not tested)                       Strength:    Strength: Generally decreased, functional                    Tone & Sensation:   Tone: Normal              Sensation: Intact               Coordination:  Coordination: Within functional limits  Vision:   Tracking: Able to track stimulus in all quadrants w/o difficulty  Acuity: Within Defined Limits  Functional Mobility:  Bed Mobility:  Rolling: Supervision  Supine to Sit: Supervision  Sit to Supine: Supervision  Scooting: Supervision  Transfers:  Sit to Stand: Contact guard assistance  Stand to Sit: Contact guard assistance        Bed to Chair: Contact guard assistance              Balance:   Sitting: Intact  Standing: Impaired  Standing - Static: Constant support; Fair  Standing - Dynamic : Fair  Ambulation/Gait Training:  Distance (ft): 70 Feet (ft)  Assistive Device: Gait belt;Crutches  Ambulation - Level of Assistance: Contact guard assistance     Gait Description (WDL): Exceptions to WDL     Right Side Weight Bearing: Non-weight bearing (maintained 100% of session)     Base of Support: Narrowed  Stance: Left increased     Step Length: Left shortened  Swing Pattern: Other (comment) (swing to with LLE)     Interventions: Verbal cues; Tactile cues; Safety awareness training;Visual/Demos         Largely accelerated with turns, initial use of crutches (leaned onto with axillae) but able to self correct   Functional Measure:  Barthel Index:    Bathin  Bladder: 10  Bowels: 10  Groomin  Dressin  Feeding: 10  Mobility: 0  Stairs: 0  Toilet Use: 5  Transfer (Bed to Chair and Back): 10  Total: 55       Barthel and G-code impairment scale:  Percentage of impairment CH  0% CI  1-19% CJ  20-39% CK  40-59% CL  60-79% CM  80-99% CN  100%   Barthel Score 0-100 100 99-80 79-60 59-40 20-39 1-19   0   Barthel Score 0-20 20 17-19 13-16 9-12 5-8 1-4 0      The Barthel ADL Index: Guidelines  1. The index should be used as a record of what a patient does, not as a record of what a patient could do. 2. The main aim is to establish degree of independence from any help, physical or verbal, however minor and for whatever reason. 3. The need for supervision renders the patient not independent. 4. A patient's performance should be established using the best available evidence. Asking the patient, friends/relatives and nurses are the usual sources, but direct observation and common sense are also important.  However direct testing is not needed. 5. Usually the patient's performance over the preceding 24-48 hours is important, but occasionally longer periods will be relevant. 6. Middle categories imply that the patient supplies over 50 per cent of the effort. 7. Use of aids to be independent is allowed. Jared Parks., Barthel, D.W. (9413). Functional evaluation: the Barthel Index. [facility] (14)2. Elena Leahy davon MARYLOU Pichrado, Stephanie Waldron., Kaushik Rm, Guille, [address] (1999). Measuring the change indisability after inpatient rehabilitation; comparison of the responsiveness of the Barthel Index and Functional [place] Measure. Journal of Neurology, Neurosurgery, and Psychiatry, 66(4), 992-605. PHIL Weldon, RHONDA Palm, & Ramírez Charles MBELEM. (2004.) Assessment of post-stroke quality of life in cost-effectiveness studies: The usefulness of the Barthel Index and the EuroQoL-5D. Quality of Life Research, 13, 699-76         G codes: In compliance with CMSs Claims Based Outcome Reporting, the following G-code set was chosen for this patient based on their primary functional limitation being treated: The outcome measure chosen to determine the severity of the functional limitation was the barthel with a score of 55/100 which was correlated with the impairment scale. ? Mobility - Walking and Moving Around:     - CURRENT STATUS: CK - 40%-59% impaired, limited or restricted    - GOAL STATUS: CI - 1%-19% impaired, limited or restricted    - D/C STATUS:  ---------------To be determined---------------     Pain:  Pain Scale 1: Numeric (0 - 10)  Pain Intensity 1: 3  Pain Location 1: Knee  Pain Orientation 1: Left  Pain Description 1: Aching  Pain Intervention(s) 1: Medication (see MAR); Elevation; Emotional support;Ice;Repositioned  Activity Tolerance:   Fatigue, pain limiting safety and activity tolerance     Please refer to the flowsheet for vital signs taken during this treatment.   After treatment:   []         Patient left in no apparent distress sitting up in chair  [x]         Patient left in no apparent distress in bed as he refused the chair   [x]         Call bell left within reach  [x]         Nursing notified  []         Caregiver present  []         Bed alarm activated    COMMUNICATION/EDUCATION:   The patients plan of care was discussed with: Registered Nurse. [x]         Fall prevention education was provided and the patient/caregiver indicated understanding. [x]         Patient/family have participated as able in goal setting and plan of care. [x]         Patient/family agree to work toward stated goals and plan of care. []         Patient understands intent and goals of therapy, but is neutral about his/her participation. []         Patient is unable to participate in goal setting and plan of care.     Thank you for this referral.  Niecy Coffman, PT, DPT, CEMARISOL      Time Calculation: 18 mins

## 2018-06-06 VITALS
HEIGHT: 64 IN | SYSTOLIC BLOOD PRESSURE: 152 MMHG | DIASTOLIC BLOOD PRESSURE: 94 MMHG | BODY MASS INDEX: 21.34 KG/M2 | WEIGHT: 125 LBS | RESPIRATION RATE: 18 BRPM | OXYGEN SATURATION: 97 % | HEART RATE: 90 BPM | TEMPERATURE: 98.3 F

## 2018-06-06 LAB — HGB BLD-MCNC: 13.6 G/DL (ref 12.1–17)

## 2018-06-06 PROCEDURE — 36415 COLL VENOUS BLD VENIPUNCTURE: CPT | Performed by: PHYSICIAN ASSISTANT

## 2018-06-06 PROCEDURE — 85018 HEMOGLOBIN: CPT | Performed by: PHYSICIAN ASSISTANT

## 2018-06-06 PROCEDURE — 74011250637 HC RX REV CODE- 250/637: Performed by: PHYSICIAN ASSISTANT

## 2018-06-06 PROCEDURE — 97530 THERAPEUTIC ACTIVITIES: CPT

## 2018-06-06 PROCEDURE — 97116 GAIT TRAINING THERAPY: CPT

## 2018-06-06 RX ORDER — OXYCODONE HYDROCHLORIDE 5 MG/1
5-10 TABLET ORAL
Qty: 84 TAB | Refills: 0 | Status: SHIPPED | OUTPATIENT
Start: 2018-06-06

## 2018-06-06 RX ORDER — FAMOTIDINE 20 MG/1
20 TABLET, FILM COATED ORAL 2 TIMES DAILY
Qty: 60 TAB | Refills: 0 | Status: SHIPPED | OUTPATIENT
Start: 2018-06-06

## 2018-06-06 RX ORDER — AMOXICILLIN 250 MG
1 CAPSULE ORAL 2 TIMES DAILY
Qty: 30 TAB | Refills: 0 | Status: SHIPPED | OUTPATIENT
Start: 2018-06-06

## 2018-06-06 RX ORDER — GUAIFENESIN 100 MG/5ML
81 LIQUID (ML) ORAL 2 TIMES DAILY
Qty: 60 TAB | Refills: 0 | Status: SHIPPED | OUTPATIENT
Start: 2018-06-06

## 2018-06-06 RX ADMIN — OXYCODONE HYDROCHLORIDE 10 MG: 5 TABLET ORAL at 02:02

## 2018-06-06 RX ADMIN — Medication 10 ML: at 06:00

## 2018-06-06 RX ADMIN — OXYCODONE HYDROCHLORIDE 10 MG: 5 TABLET ORAL at 06:13

## 2018-06-06 RX ADMIN — ACETAMINOPHEN 650 MG: 325 TABLET ORAL at 00:37

## 2018-06-06 RX ADMIN — OXYCODONE HYDROCHLORIDE 10 MG: 5 TABLET ORAL at 11:57

## 2018-06-06 RX ADMIN — ACETAMINOPHEN 650 MG: 325 TABLET ORAL at 06:13

## 2018-06-06 NOTE — PROGRESS NOTES
Physical Therapy Goals  Initiated 6/5/2018  1. Patient will move from supine to sit and sit to supine , scoot up and down and roll side to side in bed with independence within 7 day(s). 2. Patient will transfer from bed to chair and chair to bed with modified independence using the least restrictive device within 7 day(s). 3. Patient will perform sit to stand with independence within 7 day(s). 4. Patient will ambulate with modified independence for 656 feet with the least restrictive device within 7 day(s). 5. Patient will ascend/descend 12 stairs with dual handrail(s) with modified independence within 7 day(s). physical Therapy TREATMENT  Patient: Miladys Swanson (55 y.o. male)  Date: 6/6/2018  Diagnosis: Tibia/fibula fracture, shaft  right ankle fracture <principal problem not specified>  Procedure(s) (LRB):  TIBIA INSERTION INTRA MEDULLARY NAIL (Right) 2 Days Post-Op  Precautions: Fall, NWB (patient very impulsive, and deaf (reads lips, hearing aids))  Chart, physical therapy assessment, plan of care and goals were reviewed. ASSESSMENT:  Patient ready for DC to home from PT standpoint. He remains impulsive, which combined with accelerated tendency to complete tasks poses concerns for safety and fall risk. However, patient able to demonstrate much progress during session, improving on stair navigation with use of B crutches over multiple efforts combined with improved gait mechanics and safety during use of RW.device. Patient remains very tremulous and increasingly unsteady with use of axillary crutches, thus encouraged use ahead only when completing stair to enter the home given lack of rails. Otherwise, he is agreeable to use of RW device which immediately improves safety and stability. Patient declined to receive gait belt prior to DC as he reports he will simply utilize his own belt for safe navigation of steps upon return to home.  He also voices that he will have his family/friends assist him upon initial trip into the household; Yossi Childs encouraged patient to otherwise await [facility] therapy evaluation prior to further stair activity (to include ascend to second floor). Patient with questions on safe navigation of tub/shower; encouraged patient to await evaluation by [facility] therapies and OT notified to address further as able. HH PT appropriate at [place]. Progression toward goals:  [x]    Improving appropriately and progressing toward goals  []    Improving slowly and progressing toward goals  []    Not making progress toward goals and plan of care will be adjusted     PLAN:  Patient continues to benefit from skilled intervention to address the above impairments. Continue treatment per established plan of care. Discharge Recommendations:  Home Health  Further Equipment Recommendations for Discharge:  Provided forearm crutches (as no crutches available to charge out at time of visit - notified orthopedic unit director, CM, and rehab manager) for use of stairs given lack of rails to enter home; otherwise, RW necessary for safety and maintenance of NWB status of LLE      SUBJECTIVE:   Patient stated I'll have my girlfriend and friend.  referring to support upon return home; author requested patient have assistance upon return home, especially for steps     OBJECTIVE DATA SUMMARY:   Critical Behavior:  Neurologic State: Alert  Orientation Level: Oriented X4  Cognition: Appropriate decision making, Appropriate for age attention/concentration, Appropriate safety awareness, Follows commands  Safety/Judgement: Fall prevention, Home safety  Functional Mobility Training:      Transfers:  Sit to Stand: Modified independent (repeated cueing for proper use of crutches/RW )  Stand to Sit: Modified independent                             Balance:  Sitting: Intact; Without support  Standing: Impaired; With support  Standing - Static: Fair  Standing - Dynamic : Fair  Ambulation/Gait Training:  Distance (ft): 225 Feet (ft)  Assistive Device: Gait belt;Walker, rolling  Ambulation - Level of Assistance: Supervision;Stand-by assistance (for safety, crutches > RW)                    Stance: Left increased  Speed/Danita: Accelerated (RW; improved with VC's )  Step Length: Left shortened;Left lengthened (shortened with RW; excessive with crutches)  Swing Pattern: Other (comment) (swing-through LLE with crutches; swing-to RW)     Interventions: Safety awareness training; Tactile cues; Verbal cues; Visual/Demos         Utilized mainly verbal, visual facilitation to slow gait speed to acceptable pace, as otherwise accelerated unsafely although able to maintain NWB of %   Stairs:  Number of Stairs Trained: 8 (4x2 )  Stairs - Level of Assistance: Contact guard assistance;Minimum assistance (min A x1 posterior LOB correction)   Rail Use:  (B axillary crutches)    Pain:  Pain Scale 1: Numeric (0 - 10)  Pain Intensity 1: 4              Activity Tolerance: WNL    Please refer to the flowsheet for vital signs taken during this treatment.   After treatment:   [x]    Patient left in no apparent distress sitting up at EOB   []    Patient left in no apparent distress in bed  [x]    Call bell left within reach  [x]    Nursing notified  []    Caregiver present  []    Bed alarm activated    COMMUNICATION/COLLABORATION:   The patients plan of care was discussed with: Occupational Therapist, Registered Nurse,  and ortho PA     Lisa Oliver, PT, DPT, ARVIN      Time Calculation: 38 mins

## 2018-06-06 NOTE — PROGRESS NOTES
[facility] to [facility] via [employer]. CM sent referral for DME to [facility] via [employer]. CM will continue to follow to assist with discharge needs. Care Management Interventions  PCP Verified by CM: Yes (Dr. Betsy Kramer, [facility] ([phone]) )  Mode of Transport at Discharge:  Other (see comment) ([facility] )  Transition of Care Consult (CM Consult): [address]: No  Reason Outside [facility]: Patient already serviced by other home care/hospice agency  Discharge Durable Medical Equipment: Yes (crutches )  Health Maintenance Reviewed: Yes  Physical Therapy Consult: Yes  Occupational Therapy Consult: Yes  Speech Therapy Consult: No  Confirm Follow Up Transport: Family  Plan discussed with Pt/Family/Caregiver: Yes  Discharge Location  Discharge Placement: Home with home health    ROMAIN Colmenares, [address] [facility]   [phone]

## 2018-06-06 NOTE — DISCHARGE SUMMARY
Orthopedic Service Discharge Summary    Patient ID:  Olamide Mark  [id number]  male  27 y.o.  1985    Admit date: 6/3/2018    Discharge date and time: No discharge date for patient encounter. Admitting Physician: Alesia Hubbard MD     Discharge Physician: JAN Hernandez MD  Consulting Physician(s): Treatment Team: Attending Provider: Alesia Hubbard MD; Utilization Review: Ashlyn Navarro RN; Surgeon: Herbie Williamson MD; Care Manager: Magnocarlos a Monroy    Date of Surgery: 6/4/2018     Preoperative Diagnosis:  right tibial shaft fxr    Postoperative Diagnosis: right tibial shaft fxr    Procedure(s): Procedure(s):TIBIA INSERTION INTRA MEDULLARY NAIL    Surgeon: Yesika Richardson) and Role:   Sedrick Santillan MD - Primary      Anesthesia:  gen    Preoperative Medical Clearance:                           HPI:  Pt is a 27 y.o. male who has a history of Tibia/fibula fracture, shaft  right ankle fracture  with pain and limitations of activities of daily living who presents at this time ORIF.    PMH:   Past Medical History:   Diagnosis Date    Deafness        Medications upon admission :   Prior to Admission Medications   Prescriptions Last Dose Informant Patient Reported? Taking?   aspirin/salicylamide/caffeine (BC HEADACHE POWDER PO) 6/3/2018 at Unknown time  Yes Yes   Sig: Take  by mouth. Facility-Administered Medications: None        Allergies: Allergies   Allergen Reactions    Sulfa (Sulfonamide Antibiotics) [facility] Course: The patient underwent surgery. Complications:  None; patient tolerated the procedure well. Was taken to the PACU in stable condition and then transferred to the Orthopedics floor.       Perioperative Antibiotics:  Ancef     Postoperative Pain Management:  Oxycodone    DVT Prophylaxis: ASA     Postoperative transfusions:     none Banked PRBCs     Post Op complications: none    Hemoglobin at discharge:    Lab Results   Component Value Date/Time    HGB 13.6 06/06/2018 04:42 AM    INR 0.9 06/03/2018 09:47 AM       Dressing was changed on POD # 2. Incision - clean, dry and intact. No significant erythema or swelling. Neurovascular exam within normal limits. Wound appears to be healing without any evidence of infection. Physical Therapy started on the day following surgery and progressed to ambulation with the aid of a rolling walker with independence. Range of motion  limited by pain. At the time of discharge, able to go up and down stairs and had understanding of precautions needed following surgery. Discharged to: Home. W/ HH    Discharge instructions:  -See Full Summary of discharge instructions attached  -Anticoagulate with ASA  -Resume pre hospital diet            -Resume home medications   Current Discharge Medication List      START taking these medications    Details   aspirin 81 mg chewable tablet Take 1 Tab by mouth two (2) times a day. Qty: 60 Tab, Refills: 0      oxyCODONE IR (ROXICODONE) 5 mg immediate release tablet Take 1-2 Tabs by mouth every four (4) hours as needed. Max Daily Amount: 60 mg.  Qty: 84 Tab, Refills: 0    Associated Diagnoses: Closed displaced comminuted fracture of shaft of right tibia, initial encounter      senna-docusate (PERICOLACE) 8.6-50 mg per tablet Take 1 Tab by mouth two (2) times a day. Qty: 30 Tab, Refills: 0      naloxone (NARCAN) 2 mg/actuation spry Use 1 spray intranasally into 1 nostril. Use a new Narcan nasal spray for subsequent doses and administer into alternating nostrils. May repeat every 2 to 3 minutes as needed. Indications: OPIATE-INDUCED RESPIRATORY DEPRESSION, Opioid Toxicity  Qty: 1 Bottle, Refills: 0      famotidine (PEPCID) 20 mg tablet Take 1 Tab by mouth two (2) times a day.   Qty: 60 Tab, Refills: 0         CONTINUE these medications which have NOT CHANGED    Details   aspirin/salicylamide/caffeine (BC HEADACHE POWDER PO) Take  by mouth.          per medical continuation form  -Discharge activity: Activity as tolerated  -Ambulate with crutches Walkers, Type: Rolling Walker, NWB on the RLE  -Wound Care Keep wound clean and dry, Reinforce dressing PRN, Ice to area for comfort and As directed  -Follow up in office in the next few weeks      Signed:  Neida Lee PA-C  6/6/2018  11:25 AM

## 2018-06-06 NOTE — DISCHARGE INSTRUCTIONS
Abril Richards  Surgery: right tibial Fracture Repair  Surgeon:   Honorio Bauer MD  Surgery Date:  6/4/2018     To relieve pain:   Use ice/gel packs.  Put the ice pack directly over the wound, or anywhere you are hurting or swollen.  To control pain and swelling, keep ice on regularly, especially after physical activity.  The packs should stay cold for 3-4 hours. When it is not cold anymore, rotate with the packs in the freezer.  Elevate your leg. This will also keep swelling down. Work on bending your knee and ankle several times a day.  Rest for at least 20 minutes between activity or exercises.  To keep track of your pain medications, write down what you take and when you take it.  The last dose of pain medication you got in the hospital was:     Medication    Dose    Date & Time         Choose your medications based on the pain scale below:     To keep your pain under control, take Tylenol every 6 hours for 14 days - even if you feel like you dont need it.  For mild to moderate pain (1-6 on pain scale), take one pain pill every 3-4 hours as needed.  For severe pain (7-10 on pain scale), take two pain pills every 3-4 hours as needed.  To prevent nausea, take your pain medications with food. Pain Scale                 As your pain lessens:     Slowly start taking less pain medication. You may do this by waiting longer between doses or by taking smaller doses.  Stop using the pain medications as soon as you no longer need it, usually in 2-3 weeks.  Aspirin   To prevent blood clots, you will need to take Aspirin 81 mg twice a day for 30 days.  To prevent stomach upset or bleeding:   Take Pepcid 20 mg twice a day, or a similar home medication, while you are taking a blood thinner.    Do not take non-steroidal anti-inflammatory (NSAID) medications (Ibuprofen, Advil, Motrin, Naproxen, etc.)                                               OPSITE (Honeycomb dressing)     Keep your clear, waterproof dressing in place for 5-7 days after your leave the hospital.      If you are still having drainage, you will need to change your dressing in 5-7 days. You will be given one extra dressing to use at home.  If there is no more drainage from the wound, you may leave it open to the air. OPSITE DRESSING INSTRUCTIONS    -         Do not apply oils or lotions over it.  You may shower with this dressing but do not soak it. Gently pat your wound dry when you get out of the shower.  DO NOTs:   Do not rub your surgical wound   Do not put lotion or oils on your wound.  Do not take a tub bath or go swimming until your doctor says it is ok.  You may shower with this dressing over your wound.  After showering pat the dressing dry.  If you have staples a home health nurse will remove them in about 10 -14 days after surgery.  To increase and promote healing:     Stop Smoking (or at least cut back on       Smoking).  Eat a well-balanced diet (high in protein       and vitamin C).  If you have a poor appetite, drink Ensure, Glucerna, or CarnationInstant Breakfast for the next 30 days.  If you are diabetic, you should control your blood         sugars to prevent infection and help your wound         to heal.       To prevent constipation, stay active & drink plenty of fluid.  While using pain medications, you should also take stool softeners and laxatives, such as Pericolace and Miralax.  If you are having too many bowel movements, then you may need  to stop taking the laxatives.  You should have a bowel movement 3-4 days after surgery and then at least every other day while on pain medication.  To improve your recovery, you must be active!      WEIGHT BEARING   None = you may not put any weight or pressure through that leg.  THERAPY   If you go to a rehab facility, physical therapy (PT) will need to work with you daily, and sometimes twice a day to teach you how to:     Get in and out of the bed   Walk (gait training) and climb stairs   Do exercises and gain strength   Use a walker, crutches or cane     You may also need to have occupational therapy (OT) work with you to help you practice:     Getting on and off the toilet   Self-care (brushing teeth, eating, bathing, etc)     If you go directly home, home health physical therapy will come the day after you leave the hospital.  They will visit about 4 times over the next couple of weeks to teach you how to get out of bed, to safely walk in your home, and to do your exercises.  NO DRIVING until your surgeon tells you it is ok.  You can return to work when cleared by a physician.  Please call your physician immediately if you have:     Constant bleeding from your wound.  Increasing redness or swelling around your wound (Some warmth, bruising and swelling is normal).  Change in wound drainage (increase in amount, color, or bad smell).  Change in mental status (unusual behavior)     Temperature over 101.5 degrees Fahrenheit     Increased pain, swelling, or redness in the calf (back of your lower leg), thigh, ankle or foot.  Emergency: CALL 911 if you have:   Shortness of breath   Chest pain when you cough or taking a deep breath     Please call your surgeons office at [place] [phone] for a follow up appointment.  You should call as soon as you get home or the next day after discharge. Ask to make an appointment in 4 weeks.

## 2018-06-06 NOTE — PROGRESS NOTES
Bedside and Verbal shift change report given to Irish (oncoming nurse) by Hema Brown (offgoing nurse). Report included the following information SBAR, Kardex, MAR and Recent Results.

## 2018-06-06 NOTE — PROGRESS NOTES
ORTHO - Progress Note  Post Op day: 2 Days [address]     [id number]  male    27 y.o.    1985    Admit date:  6/3/2018  Date of Surgery:  2018   Procedures:  Procedure(s):  TIBIA INSERTION INTRA MEDULLARY NAIL  Admitting Physician: Gagandeep Groves MD   Surgeon:  Ajith Clarke) and Role:     * Miracle Borja MD - Primary    Consulting Physician(s): Treatment Team: Attending Provider: Gagandeep Groves MD; Utilization Review: Marge Rausch RN; Surgeon: Miracle Borja MD; Care Manager: Gonzalo Sheffield    SUBJECTIVE:     Sonali Berger is a 27 y.o. male s/p a right TIBIA INSERTION INTRA MEDULLARY NAIL standing in the doorway ready for discharge. Patient has complaints of pain, tolerating PO narcotics. Denies F/C, nausea, vomiting, dizziness, lightheadedness, chest pain, or shortness of breath. OBJECTIVE:       Physical Exam:  General: alert, cooperative, no distress. Gastrointestinal:  Soft, non-distended. Cardiovascular: equal pulses in the lower extremities,  Brisk cap refill in all distal extremities   Genitourinary: Voiding independently   Respiratory: No respiratory distress   Neurological:Neurovascular exam within normal limits. Senstion intact: LE bilat   Motor: + DF/PF/EHL. Knee flex 45, active ankle motion 75% non affected side  Musculoskeletal: Agus's sign negative in bilateral lower extremities. Calves soft, supple, non-tender upon palpation or with passive stretch. Skin: NWD  Dressing/Wound:  Clean, dry and intact changed to waterproof dressing today. No significant erythema or swelling.     Vital Signs:       Patient Vitals for the past 8 hrs:   BP Temp Pulse Resp SpO2   18 0435 (!) 152/94 98.3 °F (36.8 °C) 90 18 97 %                                          Temp (24hrs), Av.9 °F (37.2 °C), Min:98.3 °F (36.8 °C), Max:99.6 °F (37.6 °C)       Labs:        Recent Labs      18   0442   HGB  13.6     PT/OT:        Gait  Base of Support: Narrowed  Step Length: Left shortened  Swing Pattern: Other (comment) (swing to with LLE)  Stance: Left increased  Ambulation - Level of Assistance: Contact guard assistance  Distance (ft): 70 Feet (ft)  Assistive Device: Gait belt, Crutches  Interventions: Verbal cues, Tactile cues, Safety awareness training, Visual/Demos  Interventions: Verbal cues, Tactile cues, Safety awareness training, Visual/Demos  ASSESSMENT / PLAN:   Active Problems:    Tibia/fibula fracture, shaft (6/3/2018)    Pt doing well with PT, states his has crutches at home but likes the rolling walker  Encouraged pt to take his time and go slow!!!  NWB on the RLE, work on knee and ankle ROM. ASA for DVT pro.   DC home with [facility] today    Signed By: Tony Snider PA-C

## 2018-06-06 NOTE — PROGRESS NOTES
Reviewed discharge instructions with patient and his friend, all questions answered. IV access removed yesterday. Patient discharged vi wheelchair without difficulty.     Harris Ndiaye RN

## 2018-06-06 NOTE — PROGRESS NOTES
Pt has been informed not to attempt tub transfer at this time due to decreased balance and NWB status. Formal OT treatment was not performed as pt was discharged prior to full treatment being completed. Pt has been moblizing on his own with RW in room and halls. He reports that he will have assist at home. Recommend home health at discharge and shower chair.

## 2018-06-06 NOTE — PROGRESS NOTES
Pt has been accepted by [facility] for [facility] needs. CM sent initial referral for crutches but was advised pt will require rolling walker. CM corrected referral and provided order for rolling walker to [facility]. Pt rolling walker has been approved and CM delivered to pt. CM updated AVS to reflect providers. Care Management Interventions  PCP Verified by CM: Yes (Dr. Adonis Shelton, Trina Mcgraw ([phone]) )  Mode of Transport at Discharge:  Other (see comment) ([facility] )  Transition of Care Consult (CM Consult): [address]: No  Reason Outside [place]: Patient already serviced by other home care/hospice agency  Discharge Durable Medical Equipment: Yes ([address] )  Health Maintenance Reviewed: Yes  Physical Therapy Consult: Yes  Occupational Therapy Consult: Yes  Speech Therapy Consult: No  Confirm Follow Up Transport: Family  Plan discussed with Pt/Family/Caregiver: Yes  Discharge Location  Discharge Placement: Home with home health    ROMAIN [place], [facility]   [phone]

## 2018-06-08 ENCOUNTER — HOSPITAL ENCOUNTER (INPATIENT)
Age: 33
LOS: 4 days | Discharge: HOME | DRG: 897 | End: 2018-06-12
Attending: PSYCHIATRY & NEUROLOGY | Admitting: PSYCHIATRY & NEUROLOGY
Payer: MEDICARE

## 2018-06-08 ENCOUNTER — HOSPITAL ENCOUNTER (EMERGENCY)
Age: 33
Discharge: ANOTHER HOSPITAL | End: 2018-06-08
Attending: EMERGENCY MEDICINE
Payer: MEDICARE

## 2018-06-08 VITALS
HEART RATE: 77 BPM | DIASTOLIC BLOOD PRESSURE: 77 MMHG | OXYGEN SATURATION: 98 % | WEIGHT: 130 LBS | BODY MASS INDEX: 22.2 KG/M2 | TEMPERATURE: 98.7 F | HEIGHT: 64 IN | SYSTOLIC BLOOD PRESSURE: 125 MMHG | RESPIRATION RATE: 14 BRPM

## 2018-06-08 DIAGNOSIS — F29 PSYCHOSIS, UNSPECIFIED PSYCHOSIS TYPE (HCC): ICD-10-CM

## 2018-06-08 DIAGNOSIS — F17.200 SMOKER: ICD-10-CM

## 2018-06-08 DIAGNOSIS — F12.90 MARIJUANA USE: Primary | ICD-10-CM

## 2018-06-08 PROBLEM — F19.10 POLYSUBSTANCE ABUSE (HCC): Status: ACTIVE | Noted: 2018-06-08

## 2018-06-08 PROBLEM — F10.20 ALCOHOL DEPENDENCE (HCC): Status: ACTIVE | Noted: 2018-06-08

## 2018-06-08 PROBLEM — R45.851 SUICIDAL IDEATIONS: Status: ACTIVE | Noted: 2018-06-08

## 2018-06-08 PROBLEM — F19.959 SUBSTANCE-INDUCED PSYCHOTIC DISORDER (HCC): Status: ACTIVE | Noted: 2018-06-08

## 2018-06-08 LAB
ALBUMIN SERPL-MCNC: 3.8 G/DL (ref 3.5–5)
ALBUMIN/GLOB SERPL: 1 {RATIO} (ref 1.1–2.2)
ALP SERPL-CCNC: 70 U/L (ref 45–117)
ALT SERPL-CCNC: 86 U/L (ref 12–78)
AMPHET UR QL SCN: NEGATIVE
ANION GAP SERPL CALC-SCNC: 11 MMOL/L (ref 5–15)
APAP SERPL-MCNC: <2 UG/ML (ref 10–30)
APPEARANCE UR: CLEAR
AST SERPL-CCNC: 146 U/L (ref 15–37)
BACTERIA URNS QL MICRO: NEGATIVE /HPF
BARBITURATES UR QL SCN: NEGATIVE
BASOPHILS # BLD: 0.1 K/UL (ref 0–0.1)
BASOPHILS NFR BLD: 1 % (ref 0–1)
BENZODIAZ UR QL: NEGATIVE
BILIRUB SERPL-MCNC: 0.9 MG/DL (ref 0.2–1)
BILIRUB UR QL: NEGATIVE
BUN SERPL-MCNC: 8 MG/DL (ref 6–20)
BUN/CREAT SERPL: 10 (ref 12–20)
CALCIUM SERPL-MCNC: 8.6 MG/DL (ref 8.5–10.1)
CANNABINOIDS UR QL SCN: POSITIVE
CHLORIDE SERPL-SCNC: 99 MMOL/L (ref 97–108)
CO2 SERPL-SCNC: 25 MMOL/L (ref 21–32)
COCAINE UR QL SCN: NEGATIVE
COLOR UR: ABNORMAL
CREAT SERPL-MCNC: 0.83 MG/DL (ref 0.7–1.3)
DIFFERENTIAL METHOD BLD: ABNORMAL
DRUG SCRN COMMENT,DRGCM: ABNORMAL
EOSINOPHIL # BLD: 0.1 K/UL (ref 0–0.4)
EOSINOPHIL NFR BLD: 2 % (ref 0–7)
EPITH CASTS URNS QL MICRO: ABNORMAL /LPF
ERYTHROCYTE [DISTWIDTH] IN BLOOD BY AUTOMATED COUNT: 12.5 % (ref 11.5–14.5)
ETHANOL SERPL-MCNC: 165 MG/DL
GLOBULIN SER CALC-MCNC: 3.9 G/DL (ref 2–4)
GLUCOSE SERPL-MCNC: 98 MG/DL (ref 65–100)
GLUCOSE UR STRIP.AUTO-MCNC: NEGATIVE MG/DL
HCT VFR BLD AUTO: 36 % (ref 36.6–50.3)
HGB BLD-MCNC: 12.5 G/DL (ref 12.1–17)
HGB UR QL STRIP: ABNORMAL
HYALINE CASTS URNS QL MICRO: ABNORMAL /LPF (ref 0–5)
IMM GRANULOCYTES # BLD: 0 K/UL (ref 0–0.04)
IMM GRANULOCYTES NFR BLD AUTO: 1 % (ref 0–0.5)
KETONES UR QL STRIP.AUTO: NEGATIVE MG/DL
LEUKOCYTE ESTERASE UR QL STRIP.AUTO: NEGATIVE
LYMPHOCYTES # BLD: 1.5 K/UL (ref 0.8–3.5)
LYMPHOCYTES NFR BLD: 19 % (ref 12–49)
MCH RBC QN AUTO: 33.7 PG (ref 26–34)
MCHC RBC AUTO-ENTMCNC: 34.7 G/DL (ref 30–36.5)
MCV RBC AUTO: 97 FL (ref 80–99)
METHADONE UR QL: NEGATIVE
MONOCYTES # BLD: 1.3 K/UL (ref 0–1)
MONOCYTES NFR BLD: 16 % (ref 5–13)
NEUTS SEG # BLD: 4.7 K/UL (ref 1.8–8)
NEUTS SEG NFR BLD: 62 % (ref 32–75)
NITRITE UR QL STRIP.AUTO: NEGATIVE
NRBC # BLD: 0 K/UL (ref 0–0.01)
NRBC BLD-RTO: 0 PER 100 WBC
OPIATES UR QL: POSITIVE
PCP UR QL: NEGATIVE
PH UR STRIP: 5.5 [PH] (ref 5–8)
PLATELET # BLD AUTO: 247 K/UL (ref 150–400)
PMV BLD AUTO: 9.1 FL (ref 8.9–12.9)
POTASSIUM SERPL-SCNC: 3.4 MMOL/L (ref 3.5–5.1)
PROT SERPL-MCNC: 7.7 G/DL (ref 6.4–8.2)
PROT UR STRIP-MCNC: NEGATIVE MG/DL
RBC # BLD AUTO: 3.71 M/UL (ref 4.1–5.7)
RBC #/AREA URNS HPF: ABNORMAL /HPF (ref 0–5)
SALICYLATES SERPL-MCNC: <1.7 MG/DL (ref 2.8–20)
SODIUM SERPL-SCNC: 135 MMOL/L (ref 136–145)
SP GR UR REFRACTOMETRY: 1.01 (ref 1–1.03)
UA: UC IF INDICATED,UAUC: ABNORMAL
UROBILINOGEN UR QL STRIP.AUTO: 1 EU/DL (ref 0.2–1)
WBC # BLD AUTO: 7.7 K/UL (ref 4.1–11.1)
WBC URNS QL MICRO: ABNORMAL /HPF (ref 0–4)

## 2018-06-08 PROCEDURE — 80307 DRUG TEST PRSMV CHEM ANLYZR: CPT | Performed by: EMERGENCY MEDICINE

## 2018-06-08 PROCEDURE — 81001 URINALYSIS AUTO W/SCOPE: CPT | Performed by: EMERGENCY MEDICINE

## 2018-06-08 PROCEDURE — 97161 PT EVAL LOW COMPLEX 20 MIN: CPT | Performed by: PHYSICAL THERAPIST

## 2018-06-08 PROCEDURE — G8980 MOBILITY D/C STATUS: HCPCS | Performed by: PHYSICAL THERAPIST

## 2018-06-08 PROCEDURE — 97116 GAIT TRAINING THERAPY: CPT | Performed by: PHYSICAL THERAPIST

## 2018-06-08 PROCEDURE — 36415 COLL VENOUS BLD VENIPUNCTURE: CPT | Performed by: EMERGENCY MEDICINE

## 2018-06-08 PROCEDURE — 74011250636 HC RX REV CODE- 250/636: Performed by: FAMILY MEDICINE

## 2018-06-08 PROCEDURE — G8978 MOBILITY CURRENT STATUS: HCPCS | Performed by: PHYSICAL THERAPIST

## 2018-06-08 PROCEDURE — 74011250637 HC RX REV CODE- 250/637: Performed by: PSYCHIATRY & NEUROLOGY

## 2018-06-08 PROCEDURE — G8979 MOBILITY GOAL STATUS: HCPCS | Performed by: PHYSICAL THERAPIST

## 2018-06-08 PROCEDURE — 80053 COMPREHEN METABOLIC PANEL: CPT | Performed by: EMERGENCY MEDICINE

## 2018-06-08 PROCEDURE — 65220000003 HC RM SEMIPRIVATE PSYCH

## 2018-06-08 PROCEDURE — 85025 COMPLETE CBC W/AUTO DIFF WBC: CPT | Performed by: EMERGENCY MEDICINE

## 2018-06-08 PROCEDURE — 99284 EMERGENCY DEPT VISIT MOD MDM: CPT

## 2018-06-08 RX ORDER — ADHESIVE BANDAGE
30 BANDAGE TOPICAL DAILY PRN
Status: DISCONTINUED | OUTPATIENT
Start: 2018-06-08 | End: 2018-06-12 | Stop reason: HOSPADM

## 2018-06-08 RX ORDER — ENOXAPARIN SODIUM 100 MG/ML
40 INJECTION SUBCUTANEOUS EVERY 24 HOURS
Status: DISCONTINUED | OUTPATIENT
Start: 2018-06-08 | End: 2018-06-12 | Stop reason: HOSPADM

## 2018-06-08 RX ORDER — IBUPROFEN 400 MG/1
400 TABLET ORAL
Status: DISCONTINUED | OUTPATIENT
Start: 2018-06-08 | End: 2018-06-12 | Stop reason: HOSPADM

## 2018-06-08 RX ORDER — LANOLIN ALCOHOL/MO/W.PET/CERES
100 CREAM (GRAM) TOPICAL DAILY
Status: DISCONTINUED | OUTPATIENT
Start: 2018-06-08 | End: 2018-06-12 | Stop reason: HOSPADM

## 2018-06-08 RX ORDER — LORAZEPAM 1 MG/1
2 TABLET ORAL
Status: DISCONTINUED | OUTPATIENT
Start: 2018-06-08 | End: 2018-06-12 | Stop reason: HOSPADM

## 2018-06-08 RX ORDER — BENZTROPINE MESYLATE 1 MG/ML
2 INJECTION INTRAMUSCULAR; INTRAVENOUS
Status: DISCONTINUED | OUTPATIENT
Start: 2018-06-08 | End: 2018-06-12 | Stop reason: HOSPADM

## 2018-06-08 RX ORDER — OLANZAPINE 5 MG/1
5 TABLET ORAL
Status: DISCONTINUED | OUTPATIENT
Start: 2018-06-08 | End: 2018-06-12 | Stop reason: HOSPADM

## 2018-06-08 RX ORDER — ACETAMINOPHEN 325 MG/1
650 TABLET ORAL
Status: DISCONTINUED | OUTPATIENT
Start: 2018-06-08 | End: 2018-06-12 | Stop reason: HOSPADM

## 2018-06-08 RX ORDER — OXYCODONE HYDROCHLORIDE 5 MG/1
5 TABLET ORAL
Status: DISCONTINUED | OUTPATIENT
Start: 2018-06-08 | End: 2018-06-12 | Stop reason: HOSPADM

## 2018-06-08 RX ORDER — BENZTROPINE MESYLATE 2 MG/1
2 TABLET ORAL
Status: DISCONTINUED | OUTPATIENT
Start: 2018-06-08 | End: 2018-06-12 | Stop reason: HOSPADM

## 2018-06-08 RX ORDER — ZOLPIDEM TARTRATE 10 MG/1
10 TABLET ORAL
Status: DISCONTINUED | OUTPATIENT
Start: 2018-06-08 | End: 2018-06-12 | Stop reason: HOSPADM

## 2018-06-08 RX ORDER — LORAZEPAM 2 MG/ML
2 INJECTION INTRAMUSCULAR
Status: DISCONTINUED | OUTPATIENT
Start: 2018-06-08 | End: 2018-06-12 | Stop reason: HOSPADM

## 2018-06-08 RX ORDER — LORAZEPAM 1 MG/1
1 TABLET ORAL
Status: DISCONTINUED | OUTPATIENT
Start: 2018-06-08 | End: 2018-06-08 | Stop reason: ALTCHOICE

## 2018-06-08 RX ORDER — FOLIC ACID 1 MG/1
1 TABLET ORAL DAILY
Status: DISCONTINUED | OUTPATIENT
Start: 2018-06-08 | End: 2018-06-12 | Stop reason: HOSPADM

## 2018-06-08 RX ORDER — IBUPROFEN 200 MG
1 TABLET ORAL
Status: DISCONTINUED | OUTPATIENT
Start: 2018-06-08 | End: 2018-06-12 | Stop reason: HOSPADM

## 2018-06-08 RX ORDER — LORAZEPAM 1 MG/1
2 TABLET ORAL
Status: DISPENSED | OUTPATIENT
Start: 2018-06-08 | End: 2018-06-08

## 2018-06-08 RX ORDER — LORAZEPAM 1 MG/1
4 TABLET ORAL
Status: DISCONTINUED | OUTPATIENT
Start: 2018-06-08 | End: 2018-06-12 | Stop reason: HOSPADM

## 2018-06-08 RX ORDER — GUAIFENESIN 100 MG/5ML
81 LIQUID (ML) ORAL 2 TIMES DAILY
Status: DISCONTINUED | OUTPATIENT
Start: 2018-06-08 | End: 2018-06-08

## 2018-06-08 RX ADMIN — LORAZEPAM 2 MG: 1 TABLET ORAL at 16:12

## 2018-06-08 RX ADMIN — OXYCODONE HYDROCHLORIDE 5 MG: 5 TABLET ORAL at 11:22

## 2018-06-08 RX ADMIN — MULTIPLE VITAMINS W/ MINERALS TAB 1 TABLET: TAB at 11:17

## 2018-06-08 RX ADMIN — Medication 100 MG: at 11:17

## 2018-06-08 RX ADMIN — ASPIRIN 81 MG 81 MG: 81 TABLET ORAL at 11:19

## 2018-06-08 RX ADMIN — ENOXAPARIN SODIUM 40 MG: 40 INJECTION, SOLUTION INTRAVENOUS; SUBCUTANEOUS at 16:12

## 2018-06-08 RX ADMIN — LORAZEPAM 2 MG: 1 TABLET ORAL at 11:20

## 2018-06-08 RX ADMIN — FOLIC ACID 1 MG: 1 TABLET ORAL at 11:17

## 2018-06-08 RX ADMIN — LORAZEPAM 2 MG: 1 TABLET ORAL at 19:00

## 2018-06-08 NOTE — IP AVS SNAPSHOT
[facility] 
 
 
 [address] Patient: Miladys Swanson MRN: [id number] :1985 About your hospitalization You were admitted on:  2018 You last received care in the:  [facility]. 291 You were discharged on:  2018 Why you were hospitalized Your primary diagnosis was:  Substance-Induced Psychotic Disorder (Hcc) Your diagnoses also included:  Alcohol Dependence (Hcc), Polysubstance Abuse, Suicidal Ideations Follow-up Information Follow up With Details Comments Contact Info Keyla Quinn MD On 2018 Follow up @ 2:00 pm for orthopedic services. [facility] 
[phone] ENJORE On 2018 Follow up for intake assessment M-/8-11am & 12-3pm for SA out patient therapy. *please bring id, proof of residency, insurance cards. [facility] 
[phone] [address] [place] [address] Ln Call  [facility], [facility] 
[phone] Discharge Orders None A check abe indicates which time of day the medication should be taken. My Medications CONTINUE taking these medications Instructions Each Dose to Equal  
 Morning Noon Evening Bedtime  
 aspirin 81 mg chewable tablet Your last dose was: Your next dose is: Take 1 Tab by mouth two (2) times a day. 81 mg  
    
   
   
   
  
 famotidine 20 mg tablet Commonly known as:  PEPCID Your last dose was: Your next dose is: Take 1 Tab by mouth two (2) times a day. 20 mg  
    
   
   
   
  
 oxyCODONE IR 5 mg immediate release tablet Commonly known as:  Nobie Cyril Your last dose was: Your next dose is: Take 1-2 Tabs by mouth every four (4) hours as needed.  Max Daily Amount: 60 mg.  
 5-10 mg  
    
   
   
   
  
 senna-docusate 8.6-50 mg per tablet Commonly known as:  Jonny Goldman Your last dose was: Your next dose is: Take 1 Tab by mouth two (2) times a day. 1 Tab STOP taking these medications BC HEADACHE POWDER PO  
   
  
 naloxone 2 mg/actuation Spry Commonly known as:  [employer] Opioid Education Prescription Opioids: What You Need to Know: 
 
Prescription opioids can be used to help relieve moderate-to-severe pain and are often prescribed following a surgery or injury, or for certain health conditions. These medications can be an important part of treatment but also come with serious risks. Opioids are strong pain medicines. Examples include hydrocodone, oxycodone, fentanyl, and morphine. Heroin is an example of an illegal opioid. It is important to work with your health care provider to make sure you are getting the safest, most effective care. WHAT ARE THE RISKS AND SIDE EFFECTS OF OPIOID USE? Prescription opioids carry serious risks of addiction and overdose, especially with prolonged use. An opioid overdose, often marked by slow breathing, can cause sudden death. The use of prescription opioids can have a number of side effects as well, even when taken as directed. · Tolerance-meaning you might need to take more of a medication for the same pain relief · Physical dependence-meaning you have symptoms of withdrawal when the medication is stopped. Withdrawal symptoms can include nausea, sweating, chills, diarrhea, stomach cramps, and muscle aches. Withdrawal can last up to several weeks, depending on which drug you took and how long you took it. · Increased sensitivity to pain · Constipation · Nausea, vomiting, and dry mouth · Sleepiness and dizziness · Confusion · Depression · Low levels of testosterone that can result in lower sex drive, energy, and strength · Itching and sweating RISKS ARE GREATER WITH:      
· History of drug misuse, substance use disorder, or overdose · Mental health conditions (such as depression or anxiety) · Sleep apnea · Older age (72 years or older) · Pregnancy Avoid alcohol while taking prescription opioids. Also, unless specifically advised by your health care provider, medications to avoid include: · Benzodiazepines (such as Xanax or Valium) · Muscle relaxants (such as Soma or Flexeril) · Hypnotics (such as Ambien or Lunesta) · Other prescription opioids KNOW YOUR OPTIONS Talk to your health care provider about ways to manage your pain that don't involve prescription opioids. Some of these options may actually work better and have fewer risks and side effects. Options may include: 
· Pain relievers such as acetaminophen, ibuprofen, and naproxen · Some medications that are also used for depression or seizures · Physical therapy and exercise · Counseling to help patients learn how to cope better with triggers of pain and stress. · Application of heat or cold compress · Massage therapy · Relaxation techniques Be Informed Make sure you know the name of your medication, how much and how often to take it, and its potential risks & side effects. IF YOU ARE PRESCRIBED OPIOIDS FOR PAIN: 
· Never take opioids in greater amounts or more often than prescribed. Remember the goal is not to be pain-free but to manage your pain at a tolerable level. · Follow up with your primary care provider to: · Work together to create a plan on how to manage your pain. · Talk about ways to help manage your pain that don't involve prescription opioids. · Talk about any and all concerns and side effects. · Help prevent misuse and abuse. · Never sell or share prescription opioids · Help prevent misuse and abuse.  
· Store prescription opioids in a secure place and out of reach of others (this may include visitors, children, friends, and family). · Safely dispose of unused/unwanted prescription opioids: Find your community drug take-back program or your pharmacy mail-back program, or flush them down the toilet, following guidance from the Food and Drug Administration (www.fda.gov/Drugs/ResourcesForYou). · Visit www.cdc.gov/drugoverdose to learn about the risks of opioid abuse and overdose. · If you believe you may be struggling with addiction, tell your health care provider and ask for guidance or call [address] [place] [address] at [phone]-XYRZ. Discharge Instructions [facility]  [phone] [facility] [place] [address] [phone] [place] [address]  [phone] [employer] 32-   [phone] [employer]-  [phone] [address] [facility]  [phone] [address] [place] [address]  [phone] [employer] Announcement We are excited to announce that we are making your provider's discharge notes available to you in [employer]. You will see these notes when they are completed and signed by the physician that discharged you from your recent hospital stay. If you have any questions or concerns about any information you see in [employer], please call the Health Information Department where you were seen or reach out to your Primary Care Provider for more information about your plan of care. Introducing [facility]! Colleen Seymour introduces [employer] patient portal. Now you can access parts of your medical record, email your doctor's office, and request medication refills online. 1. In your internet browser, go to [email or web address] 2. Click on the First Time User? Click Here link in the Sign In box. You will see the New Member Sign Up page. 3. Enter your [employer] Access Code exactly as it appears below. You will not need to use this code after youve completed the sign-up process.  If you do not sign up before the expiration date, you must request a new code. · Glow Access Code: [email or web address] Expires: 9/1/2018  8:51 AM 
 
4. Enter the last four digits of your Social Security Number (xxxx) and Date of Birth (mm/dd/yyyy) as indicated and click Submit. You will be taken to the next sign-up page. 5. Create a Glow ID. This will be your Glow login ID and cannot be changed, so think of one that is secure and easy to remember. 6. Create a Glow password. You can change your password at any time. 7. Enter your Password Reset Question and Answer. This can be used at a later time if you forget your password. 8. Enter your e-mail address. You will receive e-mail notification when new information is available in [address] 9. Click Sign Up. You can now view and download portions of your medical record. 10. Click the Download Summary menu link to download a portable copy of your medical information. If you have questions, please visit the Frequently Asked Questions section of the Glow website. Remember, Glow is NOT to be used for urgent needs. For medical emergencies, dial 911. Now available from your iPhone and Android! Introducing Reginaldo Foreman As a [facility] patient, I wanted to make you aware of our electronic visit tool called Reginaldo Foreman. [facility] 24/7 allows you to connect within minutes with a medical provider 24 hours a day, seven days a week via a mobile device or tablet or logging into a secure website from your computer. You can access Reginaldo Foreman from anywhere in the [place].  
 
A virtual visit might be right for you when you have a simple condition and feel like you just dont want to get out of bed, or cant get away from work for an appointment, when your regular [facility] provider is not available (evenings, weekends or holidays), or when youre out of town and need minor care. Electronic visits cost only $49 and if the qcue 24/7 provider determines a prescription is needed to treat your condition, one can be electronically transmitted to a nearby pharmacy*. Please take a moment to enroll today if you have not already done so. The enrollment process is free and takes just a few minutes. To enroll, please download the [email or web address] to your tablet or phone, or visit [email or web address] to enroll on your computer. And, as an [address] [place] [address] patient with a edelight account, the results of your visits will be scanned into your electronic medical record and your primary care provider will be able to view the scanned results. We urge you to continue to see your regular [facility] provider for your ongoing medical care. And while your primary care provider may not be the one available when you seek a Flinja virtual visit, the peace of mind you get from getting a real diagnosis real time can be priceless. For more information on Flinja, view our Frequently Asked Questions (FAQs) at [email or web address]. Sincerely, 
 
Santos Collins MD 
Chief Medical Officer [employer] *:  certain medications cannot be prescribed via Flinja Unresulted tests-please follow up with your PCP on these results Procedure/Test Authorizing Provider Luisa Perkins MD  
 [facility] 3RD GENERATION Cordelia Cota MD  
  
Providers Seen During Your Hospitalization Provider Specialty Primary office phone Cordelia Cota MD Psychiatry [phone] Your Primary Care Physician (PCP) Primary Care Physician Office Phone Office Fax NONE ** None ** ** None ** You are allergic to the following Allergen Reactions Sulfa (Sulfonamide Antibiotics) Hives Recent Documentation Height Weight BMI Smoking Status 1.626 m 59 kg 22.31 kg/m2 Current Every Day Smoker Emergency Contacts Name Discharge Info Relation Home Work Mobile [address] CAREGIVER [3] Parent [1] [phone] Leticia Villalobos   [phone] Patient Belongings The following personal items are in your possession at time of discharge: 
  Dental Appliances: None  Visual Aid: None      Home Medications: Sent to pharmacy   Jewelry: None  Clothing: Shirt, Shorts, Footwear    Other Valuables: Cell Phone  Personal Items Sent to Safe: cell Please provide this summary of care documentation to your next provider. Signatures-by signing, you are acknowledging that this After Visit Summary has been reviewed with you and you have received a copy. Patient Signature:  ____________________________________________________________ Date:  ____________________________________________________________  
  
[facility] Provider Signature:  ____________________________________________________________ Date:  ____________________________________________________________

## 2018-06-08 NOTE — PROGRESS NOTES
physical Therapy EVALUATION/DISCHARGE  Patient: Paula Arteaga (1 y.o. male)  Date: 6/8/2018  Primary Diagnosis: substance induced mood disorder  Substance-induced psychotic disorder ([facility] [address]  Alcohol dependence ([facility] [address]  Polysubstance abuse        Precautions: Right LE NWB    ASSESSMENT :  Based on the objective data described below, the patient presents s/p R IMN of tibia on 6/4/18. Patient received in bed and willing to participate in therapy with the assistance of a . All bed mobility independent; sit<>stand transfers at Independent level. Patient ambulated 150 feet using Rolling Walker at close supervision level and compliant with RLE NWB precautions. R ankle appeared mildly edematous. Patient supine in bed with RLE elevated at end of session. Nursing presents and notified. Also, left rolling walker in room available for patient to use and continue to comply with NWB RLE precautions. To recommend nursing supervision during ambulation. Skilled acute care physical therapy is not indicated at this time. PLAN :  Discharge Recommendations: home with family/supervision  Further Equipment Recommendations for Discharge: rolling walker     SUBJECTIVE:   Patient stated I know how to use the rolling walker.     OBJECTIVE DATA SUMMARY:   HISTORY:    Past Medical History:   Diagnosis Date    Closed tibial fracture     Deafness      Past Surgical History:   Procedure Laterality Date    HX ORTHOPAEDIC Left     left leg     Prior Level of Function/Home Situation: Independent  Personal factors and/or comorbidities impacting plan of care: daily alcohol and marijuana use combined with prescription pain medication      EXAMINATION/PRESENTATION/DECISION MAKING:   Critical Behavior:   Alert and oriented x3      Hearing:   Auditory  Auditory Impairment: Deaf  Skin:  Mild bruising and erythema in R ankle region  Edema: mildly edematous R ankle    Range Of Motion:  AROM: Within functional limits (except R ankle, secondary to edema)      Strength:    Strength: Within functional limits (except R knee and R ankle)      Tone & Sensation:   Tone: Normal       Coordination:  Coordination: Within functional limits    Functional Mobility:  Bed Mobility:  Rolling: Independent  Supine to Sit: Independent  Sit to Supine: Independent  Scooting: Independent     Transfers:  Sit to Stand: Independent  Stand to Sit: Independent        Balance:   Sitting: Intact  Standing: Intact  Ambulation/Gait Training:  Distance (ft): 150 Feet (ft)  Assistive Device: Walker, rolling;Gait belt  Ambulation - Level of Assistance: Supervision  Gait Description (WDL): Exceptions to WDL  Right Side Weight Bearing: Non-weight bearing  Left Side Weight Bearing: Full  Speed/Danita: Accelerated    G codes:    Functional Reach:    Completed:  [] standing       [x] seated           Average: 12       Functional Reach Test and G-code impairment scale: For inches: Measure in cm and divide by 2.54  Percentage of Impairment CH    0%   CI    1-19% CJ    20-39% CK    40-59% CL    60-79% CM    80-99% CN     100%   Functional Reach  Score 15-25 cm 25 24 22-23 20-21 18-19 16-17 < 15   Functional Reach  Score 6-10 in 10      < 6        Functional reach: (standing)  Reaches £  6 in = High Fall Risk  Reaches 6-10 in = Moderate Fall Risk    Reaches ³  10 in = Low Fall Risk  paradise Gage al. Functional reach: a new clinical measure of balance. J Research [facility] Joselyn Ornelas; 39: [id number]. Modified Functional Reach: (seated)  Age: Men: Women:   21-39 17.9\" 17.6\"   40-59 17.5\" 15.9\"   60-79 14.4\" 13.2\"   80-97 14.0\" 12.5\"       Benigno Garcia. Forward and Lateral Sitting Functional Reach in Younger, Middle-aged, and Older Adults.  J Geriatric Phys Ther. 2007; 30:43-48     In compliance with CMSs Claims Based Outcome Reporting, the following G-code set was chosen for this patient based on their primary functional limitation being treated: The outcome measure chosen to determine the severity of the functional limitation was the functional reach test with a score of 12/15 which was correlated with the impairment scale. ? Mobility - Walking and Moving Around:     - CURRENT STATUS: CI - 1%-19% impaired, limited or restricted    - GOAL STATUS: CI - 1%-19% impaired, limited or restricted    - D/C STATUS: CI - 1%-19% impaired, limited or restricted     Based on the above components, the patient evaluation is determined to be of the following complexity level: LOW     Pain:  8/10 RLE, nursing present and aware    Activity Tolerance:   Good    Please refer to the flowsheet for vital signs taken during this treatment. After treatment:   []   Patient left in no apparent distress sitting up in chair  [x]   Patient left in no apparent distress in bed  [x]   Call bell left within reach  [x]   Nursing notified  []   Caregiver present  []   Bed alarm activated    COMMUNICATION/EDUCATION:   Communication/Collaboration:  [x]   Fall prevention education was provided and the patient/caregiver indicated understanding. [x]   Patient/family have participated as able and agree with findings and recommendations. []   Patient is unable to participate in plan of care at this time.     Findings and recommendations were discussed with: Registered Nurse    Thank you for this referral.  Best Ansari, PT   Time Calculation: 18 mins

## 2018-06-08 NOTE — ED NOTES
Interpretor arranged for patient through [employer]. Representative will return call with ETA.
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
## 2018-06-08 NOTE — ED NOTES
[employer] phone number [phone] to pass along to [facility] when I give report.

## 2018-06-08 NOTE — ED NOTES
Change of  shifts.  Pt asleep   Awa with Jovana Baeza has called with a room number at [facility]  Room number is 325-B  Phone number to call report is [phone]

## 2018-06-08 NOTE — CONSULTS
Medical Consult for [facility] Patient    Consult H&P   dictated, see patient chart MGVL#[id number]    Impression:    Francisco Araya a 27 y.o. male with past medical history of deafness and recent right leg pain/surgery presents with behavioral health problems of delusions with use of etoh and narcotics admitted for further psychiatric evaluation and treatment. Plan:   1. Psychiatry to manage mental health issues. 2. Psychiatry to manage substance withdrawal symptoms. 3. Please confirm all active meds. 4. Recheck k and replete as indicated. 5. Medically stable at this time, will follow up as needed. 6. Given recent surgery and sedatary positioning, will start lovenox while here. Hold asa.     Thank you  Uri Smith MD  6/8/2018, 2:18 PM

## 2018-06-08 NOTE — CONSULTS
Ortho Consult Note    Advised Pt was in the ED secodnary to mental issues that will require inpt treatment at [facility]. Pt is s/p IMN of tibia and should be NWB until follow up with Dr. Nash Hong. ED RN and MD noted pt was walking in ED and not compliant with NWB given current mental status.      I have spoken to Dr. Nash Hong and if pt is not able to remain NWB, would place posterior short leg splint until he is able to remain NWB     Pt to follow up as scheduled

## 2018-06-08 NOTE — CONSULTS
[address]    Monroe Heimlich  MR#: [id number]  : 1985  ACCOUNT #: [de-identified]   DATE OF SERVICE: 2018    REFERRING PHYSICIAN:  Mónica Mahmood MD    REASON FOR CONSULTATION:  Medical evaluation for psychiatric admission. CHIEF COMPLAINT:  Delusions with hallucinations. HISTORY OF PRESENT ILLNESS:  This 52-year-old male who is deaf presents delusional and having visual hallucinations. Apparently, the patient had a right tibia shaft repair on 2018, and was apparently out socializing, drinking alcohol and taking Percocet, when he started to have these hallucinations. He is brought in for further evaluation and treatment. The patient's history is obtained through an  due to his deafness. He denies any chest pain, shortness of breath, nausea, vomiting or diarrhea. He says otherwise he is healthy. PAST MEDICAL HISTORY:  Deafness and alcohol dependency. PAST SURGICAL HISTORY:  Right tibial fracture repair on 2018 at [facility] by Dr. Cyril Alvares. ALLERGIES:  SULFA. MEDICATIONS:  He is on Roxicodone 5 mg q.4 h., Diane-Colace, aspirin and Pepcid. SOCIAL HISTORY:  Does smoke a pack of cigarettes a day x20 years. Drinks alcohol. He states beer daily all day, and also uses marijuana. Denies any cocaine or heroin use. Currently, is not employed. Says he has 2 kids and single. PHYSICAL EXAMINATION:  VITAL SIGNS:  Temperature 98.2, blood pressure 131/81, pulse 76, respirations 16, weight 130 pounds. GENERAL:  Pleasant, appears very sleepy, has to be aroused several times during the evaluation. HEENT:  Oropharynx is clear. NECK:  Supple. LUNGS:  Clear to auscultation. No wheeze, rales or rhonchi. CARDIOVASCULAR:  Regular rate. No murmurs, gallops or rubs. ABDOMEN:  Soft, nontender, nondistended. Normoactive bowel sounds. No hepatosplenomegaly.   EXTREMITIES:  The right lower extremity where he had a surgery is erythematous, is mildly tender to palpation, some edema and bruising noted. The patient states this is postoperatively. He has 2+ dorsalis pedis pulse. The color of his foot and his toes are within normal limits. LABORATORY DATA:  Hemoglobin 12.5, hematocrit 36.0, WBC 7.7, platelets 448. UA was negative. Sodium 135, potassium 3.4, chloride 99, bicarbonate 25, BUN was 8, creatinine 0.83, glucose is 98. Tox screen is positive for opioids and marijuana. Alcohol level is 165. Acetaminophen was less than 2. Salicylate was less than 1.7. IMPRESSION:  This is a 68-year-old male with past medical history of deafness, alcohol dependency and abuse, recent right tibial fracture, status post repair, presents with visual hallucinations and delusions, admitted for further psychiatric evaluation and treatment. PLAN:  1. Psychiatric management of mental health issues. 2.  Psychiatry to manage alcohol withdrawal symptoms. 3.  Obviously acute psychosis is likely related to alcohol use, postoperative course, along with narcotic pain medicines. 4.  Recheck potassium and replete as indicated. 5.  Hemoglobin is a little low. We will start him on ferrous sulfate. 6.  We will follow along during this hospitalization, appears medically stable. 7.  Given his postoperative course and him being in the hospital, we will start him on Lovenox for DVT prophylaxis. We will follow along during his hospitalization. 8.  We will continue pain meds at this time for his postoperative course.       Marsha Gaviria MD DC / Valerie Canales  D: 06/08/2018 14:57     T: 06/08/2018 15:19  JOB #: [id number]

## 2018-06-08 NOTE — PROGRESS NOTES
RLE;  Pedal pulse is present and strong. + circulation . Edema 2+. Patient in bed and leg is elevated.

## 2018-06-08 NOTE — IP AVS SNAPSHOT
[address] [facility] [place] 
 
 
 [address] Patient: Maude Garcia MRN: [id number] :1985 A check abe indicates which time of day the medication should be taken. My Medications CONTINUE taking these medications Instructions Each Dose to Equal  
 Morning Noon Evening Bedtime  
 aspirin 81 mg chewable tablet Your last dose was: Your next dose is: Take 1 Tab by mouth two (2) times a day. 81 mg  
    
   
   
   
  
 famotidine 20 mg tablet Commonly known as:  PEPCID Your last dose was: Your next dose is: Take 1 Tab by mouth two (2) times a day. 20 mg  
    
   
   
   
  
 oxyCODONE IR 5 mg immediate release tablet Commonly known as:  Asif Emanuel Your last dose was: Your next dose is: Take 1-2 Tabs by mouth every four (4) hours as needed. Max Daily Amount: 60 mg.  
 5-10 mg  
    
   
   
   
  
 senna-docusate 8.6-50 mg per tablet Commonly known as:  Toby Ocasio Your last dose was: Your next dose is: Take 1 Tab by mouth two (2) times a day. 1 Tab STOP taking these medications BC HEADACHE POWDER PO  
   
  
 naloxone 2 mg/actuation Spry Commonly known as:  [employer]

## 2018-06-08 NOTE — ROUTINE PROCESS
TRANSFER - OUT REPORT:    Verbal report given to Sarah Smith RN(name) on Demetria Yan  being transferred to [facility] room 325-B(unit) for routine progression of care       Report consisted of patients Situation, Background, Assessment and   Recommendations(SBAR). Information from the following report(s) SBAR, Kardex, ED Summary, Recent Results and Med Rec Status was reviewed with the receiving nurse. Lines:       Opportunity for questions and clarification was provided.       Patient transported with:   [facility] ambulance
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
## 2018-06-08 NOTE — BH NOTES
Pt is admitted voluntarily for alcohol detox. Pt is positive for THC and etoh of 165. Pt is a daily drinker of beers. Pt has recent left ankle surgrey. Pt is deaf and close to mute. Pt has been assigned an interpretor presently working with him. Pt can use both a wheelchair and a walker in the unit. Pt is cooperative with admission process. Will monitor q 15 for safety.
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
## 2018-06-09 LAB
POTASSIUM SERPL-SCNC: 3.8 MMOL/L (ref 3.5–5.1)
TSH SERPL DL<=0.05 MIU/L-ACNC: 0.92 UIU/ML (ref 0.36–3.74)

## 2018-06-09 PROCEDURE — 74011250637 HC RX REV CODE- 250/637: Performed by: PSYCHIATRY & NEUROLOGY

## 2018-06-09 PROCEDURE — 74011250636 HC RX REV CODE- 250/636: Performed by: FAMILY MEDICINE

## 2018-06-09 PROCEDURE — 84132 ASSAY OF SERUM POTASSIUM: CPT | Performed by: FAMILY MEDICINE

## 2018-06-09 PROCEDURE — 36415 COLL VENOUS BLD VENIPUNCTURE: CPT | Performed by: PSYCHIATRY & NEUROLOGY

## 2018-06-09 PROCEDURE — 84443 ASSAY THYROID STIM HORMONE: CPT | Performed by: PSYCHIATRY & NEUROLOGY

## 2018-06-09 PROCEDURE — 65220000003 HC RM SEMIPRIVATE PSYCH

## 2018-06-09 RX ADMIN — OXYCODONE HYDROCHLORIDE 5 MG: 5 TABLET ORAL at 20:58

## 2018-06-09 RX ADMIN — IBUPROFEN 400 MG: 400 TABLET, FILM COATED ORAL at 04:40

## 2018-06-09 RX ADMIN — OXYCODONE HYDROCHLORIDE 5 MG: 5 TABLET ORAL at 08:45

## 2018-06-09 RX ADMIN — ENOXAPARIN SODIUM 40 MG: 40 INJECTION, SOLUTION INTRAVENOUS; SUBCUTANEOUS at 15:43

## 2018-06-09 RX ADMIN — MULTIPLE VITAMINS W/ MINERALS TAB 1 TABLET: TAB at 08:39

## 2018-06-09 RX ADMIN — FOLIC ACID 1 MG: 1 TABLET ORAL at 08:39

## 2018-06-09 RX ADMIN — ACETAMINOPHEN 650 MG: 325 TABLET ORAL at 20:00

## 2018-06-09 RX ADMIN — Medication 100 MG: at 08:39

## 2018-06-09 RX ADMIN — OXYCODONE HYDROCHLORIDE 5 MG: 5 TABLET ORAL at 15:43

## 2018-06-09 NOTE — PROGRESS NOTES
Problem: Chemical Dependency (Adult/Pediatric)  Goal: *STG: Remains safe in hospital  Outcome: Progressing Towards Goal  Pt is alert/oriented x4. Pt resting majority of shift. He was a little sweaty but no c/o nausea or headache. CIWA-2. No behavioral issues. Denies suicidal and homicidal ideations. Denies auditory and visual hallucinations. Will continue to monitor pt with q15 min rounds for safety.
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
## 2018-06-09 NOTE — BH NOTES
Problem: Manic Behavior (Adult/Pediatric)  Goal: *STG: Remains safe in hospital  Outcome: Progressing Towards Goal  Pt is visible in the milieu. Pt presents as anxious but cooperative. Pt is meds/meals compliant. Pt has participated in his treatment team meeting today. Pt voiced no complaints. Pt's ciwa is 0 presently. Pt ambulates in wheelchair in the unit. Will continue to monitor q 15 for safety, mood and behavior changes.

## 2018-06-09 NOTE — PROGRESS NOTES
Problem: Chemical Dependency (Adult/Pediatric)  Goal: *STG: Remains safe in hospital  Outcome: Progressing Towards Goal  Pt slept 8 hours. Pt had PRN Motrin 400 mg. Pt had no complaints or signs of distress throughout night. Respirations were unlabored. Continuing to monitor with q15 min rounds for safety.

## 2018-06-09 NOTE — BH NOTES
Pt is deaf and requires interpretor service. Agency was contacted yesterday by [place] to schedule someone to be with pt starting 0700 today but no body showed up. The agency was called back [phone] to inquire about service. They said they will call back.

## 2018-06-10 PROCEDURE — 74011250636 HC RX REV CODE- 250/636: Performed by: FAMILY MEDICINE

## 2018-06-10 PROCEDURE — 65220000003 HC RM SEMIPRIVATE PSYCH

## 2018-06-10 PROCEDURE — 74011250637 HC RX REV CODE- 250/637: Performed by: PSYCHIATRY & NEUROLOGY

## 2018-06-10 RX ADMIN — OXYCODONE HYDROCHLORIDE 5 MG: 5 TABLET ORAL at 20:09

## 2018-06-10 RX ADMIN — ACETAMINOPHEN 650 MG: 325 TABLET ORAL at 16:43

## 2018-06-10 RX ADMIN — OXYCODONE HYDROCHLORIDE 5 MG: 5 TABLET ORAL at 07:50

## 2018-06-10 RX ADMIN — ENOXAPARIN SODIUM 40 MG: 40 INJECTION, SOLUTION INTRAVENOUS; SUBCUTANEOUS at 16:42

## 2018-06-10 RX ADMIN — MULTIPLE VITAMINS W/ MINERALS TAB 1 TABLET: TAB at 07:50

## 2018-06-10 RX ADMIN — FOLIC ACID 1 MG: 1 TABLET ORAL at 07:50

## 2018-06-10 RX ADMIN — Medication 100 MG: at 07:50

## 2018-06-10 RX ADMIN — ACETAMINOPHEN 650 MG: 325 TABLET ORAL at 09:42

## 2018-06-10 RX ADMIN — OXYCODONE HYDROCHLORIDE 5 MG: 5 TABLET ORAL at 14:03

## 2018-06-10 NOTE — BH NOTES
Problem: Manic Behavior (Adult/Pediatric)  Goal: *STG: Remains safe in hospital  Outcome: Progressing Towards Goal  Pt is visible in the milieu. Pt presents as cooperative. Pt is meds/meals compliant. Pt is deaf and has interpretor service from the hospital. Pt's ciwa is 0 presently. Pt was medicated PRN for pain. Pt ambulates in wheelchair in the unit. Will continue to monitor q 15 for safety, mood and behavior changes.
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
## 2018-06-10 NOTE — PROGRESS NOTES
Problem: Chemical Dependency (Adult/Pediatric)  Goal: *STG: Remains safe in hospital  Outcome: Progressing Towards Goal  Pt slept 6 hours. Pt had uneventful night and required no PRN's. Pt had no complaints or signs of distress throughout night. Respirations were unlabored. Continuing to monitor with q15 min rounds for safety.

## 2018-06-10 NOTE — BH NOTES
Problem: Manic Behavior (Adult/Pediatric)  Goal: *STG: Remains safe in hospital  Outcome: Progressing Towards Goal  Pt is visible in the milieu. Pt presents as compliant and cooperative. Pt is finishing a safe alcohol detox presently. Pt is meds/meals compliant. Pt has participated in his treatment team meeting today. Pt's ciwa is 0 presently. Pt ambulates in wheelchair in the unit. Pt has recent lower left leg surgery. Wound care nurse consult has been ordered per Dr. Addison Pineda order. Will continue to monitor q 15 for safety, mood and behavior changes.

## 2018-06-11 PROCEDURE — 74011250636 HC RX REV CODE- 250/636: Performed by: FAMILY MEDICINE

## 2018-06-11 PROCEDURE — 65220000003 HC RM SEMIPRIVATE PSYCH

## 2018-06-11 PROCEDURE — 74011250637 HC RX REV CODE- 250/637: Performed by: PSYCHIATRY & NEUROLOGY

## 2018-06-11 RX ADMIN — MULTIPLE VITAMINS W/ MINERALS TAB 1 TABLET: TAB at 08:30

## 2018-06-11 RX ADMIN — Medication 100 MG: at 08:30

## 2018-06-11 RX ADMIN — OXYCODONE HYDROCHLORIDE 5 MG: 5 TABLET ORAL at 09:46

## 2018-06-11 RX ADMIN — ACETAMINOPHEN 650 MG: 325 TABLET ORAL at 13:53

## 2018-06-11 RX ADMIN — IBUPROFEN 400 MG: 400 TABLET, FILM COATED ORAL at 19:16

## 2018-06-11 RX ADMIN — ACETAMINOPHEN 650 MG: 325 TABLET ORAL at 01:22

## 2018-06-11 RX ADMIN — FOLIC ACID 1 MG: 1 TABLET ORAL at 08:30

## 2018-06-11 RX ADMIN — ENOXAPARIN SODIUM 40 MG: 40 INJECTION, SOLUTION INTRAVENOUS; SUBCUTANEOUS at 17:30

## 2018-06-11 RX ADMIN — ACETAMINOPHEN 650 MG: 325 TABLET ORAL at 17:30

## 2018-06-11 RX ADMIN — OXYCODONE HYDROCHLORIDE 5 MG: 5 TABLET ORAL at 21:44

## 2018-06-11 RX ADMIN — IBUPROFEN 400 MG: 400 TABLET, FILM COATED ORAL at 08:30

## 2018-06-11 NOTE — PROGRESS NOTES
Patient alert and oriented. Cooperative and calm with staff; medication and meal compliant. Patient denies thoughts of harming self or others. Denies auditory, tactile and visual hallucinations. Patient is deaf in both ears but can read lips well and communicate efficiently. Will continue to monitor and assess patient.

## 2018-06-11 NOTE — PROGRESS NOTES
Problem: Chemical Dependency (Adult/Pediatric)  Goal: *STG: Remains safe in hospital  Outcome: Progressing Towards Goal  Pt slept 6 hours. Pt had uneventful night. PRN Tylenol given for right leg pain. Respirations were unlabored. Continuing to monitor with q15 min rounds for safety.

## 2018-06-11 NOTE — BH NOTES
GROUP THERAPY PROGRESS NOTE    The patient Jeancarlos hurley 27 y.o. male is participating in [employer]. Group time: 1 hour    Personal goal for participation: To concentrate on selected task    Goal orientation: social    Group therapy participation: active    Therapeutic interventions reviewed and discussed: Crafts, games, music    Impression of participation: The patient was attentive.     Hany Reese  6/11/2018  5:16 PM

## 2018-06-11 NOTE — BH NOTES
GROUP THERAPY PROGRESS NOTE    The patient Kiara Sterling a 27 y.o. male is participating in [address] [place] [address].      Group time: 45 minutes    Personal goal for participation: To develop a personal plan for success    Goal orientation:  personal    Group therapy participation: minimal    Therapeutic interventions reviewed and discussed: positive coping strategies/goals    Impression of participation:  The patient was present-arrived late    Ivette Campos  6/11/2018  1:54 PM

## 2018-06-11 NOTE — WOUND CARE
Wound care consult from floor. Went to see the patient and he is sleeping spoke to [employer]. Will evaluate the patient later.

## 2018-06-11 NOTE — PROGRESS NOTES
Problem: Chemical Dependency (Adult/Pediatric)  Goal: *STG: Remains safe in hospital  Outcome: Progressing Towards Goal  Pt is alert/oriented x4. Calm and Cooperative. Appropriate and visible in the milieu. Attending groups. Mood is good. Meds/meal compliant. No PRNs given this shift. No behavioral issues. Denies suicidal and homicidal ideations. Denies auditory and visual hallucinations. Will continue to monitor pt with q15 min rounds for safety.
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
## 2018-06-12 VITALS
RESPIRATION RATE: 18 BRPM | WEIGHT: 130 LBS | DIASTOLIC BLOOD PRESSURE: 76 MMHG | OXYGEN SATURATION: 99 % | HEIGHT: 64 IN | HEART RATE: 68 BPM | TEMPERATURE: 98.1 F | BODY MASS INDEX: 22.2 KG/M2 | SYSTOLIC BLOOD PRESSURE: 122 MMHG

## 2018-06-12 PROCEDURE — 77030027688 HC DRSG MEPILEX 16-48IN NO BORD MOLN -A

## 2018-06-12 PROCEDURE — 74011250637 HC RX REV CODE- 250/637: Performed by: PSYCHIATRY & NEUROLOGY

## 2018-06-12 RX ADMIN — IBUPROFEN 400 MG: 400 TABLET, FILM COATED ORAL at 09:08

## 2018-06-12 RX ADMIN — MULTIPLE VITAMINS W/ MINERALS TAB 1 TABLET: TAB at 09:08

## 2018-06-12 RX ADMIN — Medication 100 MG: at 09:08

## 2018-06-12 RX ADMIN — ACETAMINOPHEN 650 MG: 325 TABLET ORAL at 12:27

## 2018-06-12 RX ADMIN — FOLIC ACID 1 MG: 1 TABLET ORAL at 09:08

## 2018-06-12 NOTE — BH NOTES
Pt visible on the unit meals and meds complaint. Pt attended and participated in schedule groups and activities with no problem noted. Pt interacted with peers and staff, pleasant upon approach verbalize feeling about discharge today. Pt leg wound changed and dress by wound care nurse. No signs of H/I S/I A/H noted or reported, pt affect brighter,no anxiety or depression observed. Pt remain on Q 15 min checks for safety, will monitor and offer support when needed.

## 2018-06-12 NOTE — BH NOTES
Pt. Requested and received tylenol X 2 for leg pain. Received relief, however, was requesting tylenol too frequently. Offered 1 dose of the Oxy 5 mg ordered; he was reluctant but took HS, with encouragement. Did not want other PRN's, and expressed concern about the MD's opinion of him getting the OxyContin. He explained his leg looked better than originally; however is quite discolored and painful-looking. No drainage noted, + redness, educated  Him about checking the colors, any streaks, more swelling or pain, or warmth in the future. States he's going home tomorrow.
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
## 2018-06-12 NOTE — DISCHARGE INSTRUCTIONS
.Kesha Huerta   [address] [phone]  [facility] [address] crisis- [phone]

## 2018-06-12 NOTE — BH NOTES
Pt. Discharged via ambulance to his home. All personal items returned to him with validating signatures. Discharge orders/instructions given to pt. Medications returned to pt. Verbal instructions re-emphasized; especially wound observational  Care and attention to pain levels and medications.

## 2018-06-12 NOTE — BH NOTES
Pt observed resting comfortably in bed with eyes closed, breathing even and unlabored. No s/s of distress noted, no complaints voiced. Pt slept 6 hours, will continue Q 15 minute monitoring for safety.

## 2018-06-12 NOTE — BH NOTES
GROUP THERAPY PROGRESS NOTE    The patient Corina Bonilal a 27 y.o. male is participating in [address] [place] [address]. Group time: 45 minutes    Personal goal for participation:  To watch relaxation DVD    Goal orientation:  relaxation    Group therapy participation: active    Therapeutic interventions reviewed and discussed: benefits of watching/other relaxation techniques    Impression of participation:  The patient was attentive-aided with the assistance of an  for the hearing impaired    Raymundo Arnold  6/12/2018  1:46 PM
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
## 2018-06-12 NOTE — WOUND CARE
Wound Care Consult from the floor : Yesterday went to see the patient and he was sleeping. Chart reviewed and  evaluated the patient this a 58-year-old white male who is deaf and h/o Alcohol dependency  had Right tibial fracture s/p  repair done on 06/04/2018 at [facility]. He  Is admitted to [facility] for having  delusional and visual hallucinations. In his RT lower extremity has  Slight edema in the ankle area with a dressing which patient does not want me to remove. He has another surgical wound closed with staples on the RT knee 3 staples and below knee 2 staples which will be removed after 5 days according to patient. He has good pedal pulse there is some ecchymotic areas around the operative sides patient did not complained any pain. Treatment done today:  Removed the dressing from the knee area and cleaned the surgical wound with NS and removed the dried blood from the area. Pat with non sting wipe and placed a 4x4 over the staples and secured the dressing with clear tape. His  was sitting and interpreting  as the patient is deaf. He is being discharged today and can be followed up by his ortho doctor.

## 2018-06-13 NOTE — BH NOTES
SOCIAL WORK    Spoke with [facility] Janet Arechiga/Nelly([phone]) letting her know I successfully faxed [facility] OT/PT orders prescribed by Dr. Aj Daniels for pt. Bryce Wagoner stated if she did not receive she would reach back out. Notified Bryce Wagoner pt was discharged yesterday.

## 2018-06-18 NOTE — OP NOTES
[facility]  OPERATIVE REPORT    Gentry Reed  MR#: [id number]  : 1985  ACCOUNT #: [de-identified]   DATE OF SERVICE: 2018    PREOPERATIVE DIAGNOSIS:  Right tib-fib fracture, closed. POSTOPERATIVE DIAGNOSIS:  Right tib-fib fracture, closed. PROCEDURE PERFORMED:  Intramedullary nail, right tibia. ANESTHESIA:  General.    SURGEON:  Wendy Nicholson MD     ASSISTANT:  TY Flores    ESTIMATED BLOOD LOSS:  Minimal.    DRAINS:  None. SPECIMENS REMOVED:  None. COMPLICATIONS:  None. CONDITION:  Stable to PACU. IMPLANTS:  Synthes tibial nail size 10 x 315 mm. INDICATIONS:  A 49-year-old male who suffered a tib-fib fracture. We discussed treatment options including conservative management and recommended operative fixation. The risks, benefits and alternatives to ORIF were explained to the patient. He wished to proceed. He understood no guarantees could be given about the outcome. DESCRIPTION OF PROCEDURE:  After being identified in the preoperative holding area and having his operative site marked, the patient was brought back to the operating room and placed supine on a standard OR table. General anesthesia was induced without difficulty. A thigh tourniquet was applied to the right lower extremity, which was prepped and draped in the usual fashion using sterile technique. Appropriate timeout was performed. The limb was exsanguinated and tourniquet inflated to 275 mmHg. A stab wound was made just below the inferior pole of the patella in line with the intramedullary canal.  The curved cannulated awl was inserted into the proximal tibia and a guidewire passed while the fracture was held in a reduced fashion. We used an indirect measuring guide to determine a nail length of 315 mm. We sequentially reamed to 11.5 mm and inserted a 10 x 315 mm nail over the guidewire. We used the targeting arm proximally to place a single oblique screw.   We then removed the targeting arm and turned our attention to the distal aspect of the fracture. The fracture was biased towards the distal end of the tibia and we felt it appropriate to place multiple screws in this region. We started by placing a single anterior to posterior screw followed by 2 medial to lateral screws using perfect circles technique, the fracture was well aligned and reduced. We confirmed the hardware placement on orthogonal views. Thorough irrigation and standard closure was performed followed by sterile compressive dressings. The patient was awakened, moved to stretcher, taken to recovery in stable condition. At the conclusion of procedure, all counts were correct. There were no immediate complications.       Alex Hussein MD       [address] [place] [address] / Regino Noyola  D: 06/18/2018 11:21     T: 06/18/2018 13:10  JOB #: [id number]

## 2018-06-22 NOTE — OP NOTES
Miracle Borja MD - Adult Reconstruction and Total Joint Replacement    Sonali Berger - MRN [id number] - : 1985 (27 y.o.)      Date: 2018    Preoperative diagnosis: right ankle fracture    Postoperative diagnosis: right ankle fracture    Procedure performed: Intraoperative fluoroscopy for Procedure(s):  TIBIA INSERTION INTRA MEDULLARY NAIL    CPT code: 01887    Anesthesia: Other    Surgeon(s) and Role:     * Miracle Borja MD - Primary    Assistant: TY Peguero      This note describes the use of intraoperative fluoroscopy. Fluoroscopic imaging was utilized in orthogonal planes as well as using live technique for all phases of the procedure, described separately in the operative report, including fracture reduction and hardware placement.     Miracle Borja MD

## 2021-07-08 NOTE — BRIEF OP NOTE
BRIEF OPERATIVE NOTE    Date of Procedure: 6/4/2018   Preoperative Diagnosis: right ankle fracture  Postoperative Diagnosis: right ankle fracture    Procedure(s):  TIBIA INSERTION INTRA MEDULLARY NAIL  Surgeon(s) and Role:     * Jeff Laureano MD - Primary         Surgical Assistant: TY Kwong    Surgical Staff:  Circ-1: Kamlesh Vegas RN  Circ-Relief: Nathen Giordano RN  Physician Assistant: TY Ferrera  Scrub Tech-1: Rochelle Arugeta  Scrub RN-Relief: Daniel Pratt RN  Surg Asst-1: [address] Staff: Long Collins RN  Event Time In   Incision Start 1208   Incision Close      Anesthesia: Other   Estimated Blood Loss: min  Specimens: * No specimens in log *   Findings: n/a   Complications: none  Implants:   Implant Name Type Inv.  Item Serial No.  Lot No. LRB No. Used Action   NAIL IM TIB JARVIS [id number] TI -- STRL EXPERT - SNA   NAIL IM TIB JARVIS [id number] TI -- STRL EXPERT NA [employer] [place] [id number] 1 Implanted
08-Jul-2021 19:01

## 2024-10-03 NOTE — PROGRESS NOTES
Initial Nutrition Assessment:    INTERVENTIONS/RECOMMENDATIONS:   · Meals/Snacks: General/healthful diet:  Continue regular diet, room assist of meals. ASSESSMENT:   6/5:  Chart reviewed; med noted for tibia/fibia fracture repair. Pt is hearing impaired and requires room assist to facilitate ordering of meals. RD assisted at bedside. Pt can read lips and has hearing aides in today but would be difficult for pt to call the kitchen to place meal order. Pt does not appear to be a large eater. Only wanted an omelette at breakfast with coffee. Diet Order: Regular  % Eaten:  No data found. Pertinent Medications: [x]Reviewed []Other  Pertinent Labs: [x]Reviewed []Other  Food Allergies: [x]None []Other   Last BM:    [x]Active     []Hyperactive  []Hypoactive       [] Absent BS  Skin:    [] Intact   [x] Incision  [] Breakdown  [] Other:    Anthropometrics:   Height: 5' 4\" (162.6 cm) Weight: 56.7 kg (125 lb)   IBW (%IBW):   ( ) UBW (%UBW):   (  %)   Last Weight Metrics:  Weight Loss Metrics 6/3/2018 11/8/2017 3/24/2015 10/24/2012 7/13/2012   Today's Wt 125 lb 130 lb 130 lb 11.7 oz 130 lb 130 lb   BMI 21.46 kg/m2 21.63 kg/m2 21.11 kg/m2 21.63 kg/m2 21.63 kg/m2       BMI: Body mass index is 21.46 kg/(m^2). This BMI is indicative of:   []Underweight    [x]Normal    []Overweight    [] Obesity   [] Extreme Obesity (BMI>40)     Estimated Nutrition Needs (Based on):   1846 Kcals/day (BMR (1420) x 1. 3AF) , 68 g (1.2 g/kg bw) Protein  Carbohydrate:  At Least 130 g/day  Fluids: 1800 mL/day (1ml/kcal)    NUTRITION DIAGNOSES:   Problem:  Increased nutrient needs      Etiology: related to tibia fracture     Signs/Symptoms: as evidenced by increased energy/protein eneds to support wound healing      NUTRITION INTERVENTIONS:  Meals/Snacks: General/healthful diet                  GOAL:   PO intake at least 50% of meals next 5-7 days    LEARNING NEEDS (Diet, Food/Nutrient-Drug Interaction):    [x] None Identified   [] Identified and Education Provided/Documented   [] Identified and Pt declined/was not appropriate     Cultureal, Scientologist, OR Ethnic Dietary Needs:    [x] None Identified   [] Identified and Addressed     [x] Interdisciplinary Care Plan Reviewed/Documented    [x] Discharge Planning:  Continue regular diet       MONITORING /EVALUATION:   Food/Nutrient Intake Outcomes:  Total energy intake  Physical Signs/Symptoms Outcomes: Weight/weight change    NUTRITION RISK:    [x] Patient At Nutritional Risk    [] High              [] Moderate/Mild           [x]  Low     [] Patient Not At Nutritional Risk    PT SEEN FOR:    [x]  MD Consult: []Calorie Count      []Diabetic Diet Education        []Diet Education     []Electrolyte Management     [x]General Nutrition Management and Supplements     []Management of Tube Feeding     []TPN Recommendations    []  RN Referral:  []MST score >=2     []Enteral/Parenteral Nutrition PTA     []Pregnant: Gestational DM or Multigestation     []Pressure Ulcer/Wound Care needs        []  Low BMI  []  CHRISTIE Montalvo, [address]  Pager [phone]  Weekend Pager [phone] no

## (undated) DEVICE — INFECTION CONTROL KIT SYS

## (undated) DEVICE — DRAPE XR C ARM 41X74IN LF --

## (undated) DEVICE — BIT DRL L145MM DIA4.2MM NONSTERILE 3 FLUT NDL PNT QUIK CPL

## (undated) DEVICE — HANDLE LT SNAP ON ULT DURABLE LENS FOR TRUMPF ALC DISPOSABLE

## (undated) DEVICE — Device

## (undated) DEVICE — GOWN,SIRUS,NONRNF,SETINSLV,2XL,18/CS: Brand: MEDLINE

## (undated) DEVICE — SOLUTION IV 1000ML 0.9% SOD CHL

## (undated) DEVICE — ZIMMER® STERILE DISPOSABLE TOURNIQUET CUFF WITH PROTECTIVE SLEEVE AND PLC, DUAL PORT, SINGLE BLADDER, 34 IN. (86 CM)

## (undated) DEVICE — 3.2MM GUIDE WIRE 400MM

## (undated) DEVICE — SUTURE VCRL SZ 2-0 L36IN ABSRB UD L40MM CT 1/2 CIR J957H

## (undated) DEVICE — PREP SKN PREVAIL 40ML APPL --

## (undated) DEVICE — GLOVE SURG SZ 8.5 L11.85IN FNGR THK7.5MIL IVRY LTX FREE

## (undated) DEVICE — ROD RMR L950MM DIA2.5MM W/ EXTN BALL TIP

## (undated) DEVICE — GOWN,SIRUS,FABRNF,XL,20/CS: Brand: MEDLINE

## (undated) DEVICE — SUTURE VCRL SZ 0 L36IN ABSRB UD L40MM CT 1/2 CIR TAPERPOINT J958H

## (undated) DEVICE — DEVON™ KNEE AND BODY STRAP 60" X 3" (1.5 M X 7.6 CM): Brand: DEVON

## (undated) DEVICE — STERILE LATEX POWDER-FREE SURGICAL GLOVESWITH NITRILE COATING: Brand: PROTEXIS

## (undated) DEVICE — SOLUTION IRRIG 1000ML H2O STRL BLT

## (undated) DEVICE — SLIM BODY SKIN STAPLER: Brand: APPOSE ULC

## (undated) DEVICE — PADDING CAST SPEC 6INX4YD COT --

## (undated) DEVICE — BIT DRL L330MM DIA4.2MM CALIB 100MM 3 FLUT QUIK CPL

## (undated) DEVICE — WATERPROOF, BACTERIA PROOF DRESSING WITH ABSORBENT SEE THROUGH PAD: Brand: OPSITE POST-OP VISIBLE 10X8CM CTN 20

## (undated) DEVICE — 3000CC GUARDIAN II: Brand: GUARDIAN

## (undated) DEVICE — REM POLYHESIVE ADULT PATIENT RETURN ELECTRODE: Brand: VALLEYLAB

## (undated) DEVICE — (D)BNDG COHESIVE 6X5YD TAN LTX -- DUPE USE ITEM 357348